# Patient Record
Sex: FEMALE | Race: WHITE | NOT HISPANIC OR LATINO | URBAN - METROPOLITAN AREA
[De-identification: names, ages, dates, MRNs, and addresses within clinical notes are randomized per-mention and may not be internally consistent; named-entity substitution may affect disease eponyms.]

---

## 2017-04-13 ENCOUNTER — OUTPATIENT (OUTPATIENT)
Dept: OUTPATIENT SERVICES | Facility: HOSPITAL | Age: 72
LOS: 1 days | Discharge: HOME | End: 2017-04-13

## 2017-04-13 DIAGNOSIS — Z02.1 ENCOUNTER FOR PRE-EMPLOYMENT EXAMINATION: ICD-10-CM

## 2017-04-13 LAB
MEV IGG SER-ACNC: >300 AU/ML — SIGNIFICANT CHANGE UP
MEV IGG+IGM SER-IMP: POSITIVE — SIGNIFICANT CHANGE UP
MUV AB SER-ACNC: POSITIVE — SIGNIFICANT CHANGE UP
MUV IGG FLD-ACNC: 66.9 AU/ML — SIGNIFICANT CHANGE UP
RUBV IGG SER-ACNC: 32.6 INDEX — SIGNIFICANT CHANGE UP
RUBV IGG SER-IMP: POSITIVE — SIGNIFICANT CHANGE UP
VZV IGG FLD QL IA: 236.9 INDEX — SIGNIFICANT CHANGE UP
VZV IGG FLD QL IA: POSITIVE — SIGNIFICANT CHANGE UP

## 2017-11-15 PROBLEM — Z00.00 ENCOUNTER FOR PREVENTIVE HEALTH EXAMINATION: Status: ACTIVE | Noted: 2017-11-15

## 2017-12-15 ENCOUNTER — APPOINTMENT (OUTPATIENT)
Dept: OBGYN | Facility: CLINIC | Age: 72
End: 2017-12-15
Payer: MEDICARE

## 2017-12-15 PROCEDURE — G0101: CPT

## 2017-12-15 PROCEDURE — 82270 OCCULT BLOOD FECES: CPT

## 2017-12-18 RX ORDER — ROSUVASTATIN CALCIUM 5 MG/1
5 TABLET, FILM COATED ORAL
Qty: 90 | Refills: 0 | Status: ACTIVE | COMMUNITY
Start: 2017-04-11

## 2017-12-18 RX ORDER — AMLODIPINE BESYLATE 5 MG/1
5 TABLET ORAL
Qty: 90 | Refills: 0 | Status: ACTIVE | COMMUNITY
Start: 2017-10-30

## 2018-01-03 LAB — CYTOLOGY CVX/VAG DOC THIN PREP: NORMAL

## 2018-01-05 ENCOUNTER — RESULT REVIEW (OUTPATIENT)
Age: 73
End: 2018-01-05

## 2018-02-03 ENCOUNTER — TRANSCRIPTION ENCOUNTER (OUTPATIENT)
Age: 73
End: 2018-02-03

## 2019-01-22 ENCOUNTER — APPOINTMENT (OUTPATIENT)
Dept: OBGYN | Facility: CLINIC | Age: 74
End: 2019-01-22
Payer: MEDICARE

## 2019-01-22 DIAGNOSIS — C34.11 MALIGNANT NEOPLASM OF UPPER LOBE, RIGHT BRONCHUS OR LUNG: ICD-10-CM

## 2019-01-22 DIAGNOSIS — M85.80 OTHER SPECIFIED DISORDERS OF BONE DENSITY AND STRUCTURE, UNSPECIFIED SITE: ICD-10-CM

## 2019-01-22 PROCEDURE — 82270 OCCULT BLOOD FECES: CPT

## 2019-01-22 PROCEDURE — G0101: CPT

## 2019-01-28 NOTE — PHYSICAL EXAM
[Awake] : awake [Alert] : alert [Acute Distress] : no acute distress [Mass] : no breast mass [Nipple Discharge] : no nipple discharge [Axillary LAD] : no axillary lymphadenopathy [Soft] : soft [Tender] : non tender [Oriented x3] : oriented to person, place, and time [Normal] : uterus [Atrophy] : atrophy [Rectocele] : a rectocele [No Bleeding] : there was no active vaginal bleeding [Uterine Adnexae] : were not tender and not enlarged [No Tenderness] : no rectal tenderness [Occult Blood] : occult blood test from digital rectal exam was negative [Nl Sphincter Tone] : normal sphincter tone [Internal Hemorrhoid] : no internal hemorrhoids [External Hemorrhoid] : no external hemorrhoids [FreeTextEntry6] : NEG

## 2019-01-28 NOTE — CHIEF COMPLAINT
[FreeTextEntry1] : Patient presents for annual visit.  Patient with a known rectocele and vaginal atrophy.  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

## 2019-12-02 ENCOUNTER — TRANSCRIPTION ENCOUNTER (OUTPATIENT)
Age: 74
End: 2019-12-02

## 2020-01-13 ENCOUNTER — APPOINTMENT (OUTPATIENT)
Dept: OBGYN | Facility: CLINIC | Age: 75
End: 2020-01-13

## 2020-01-28 ENCOUNTER — APPOINTMENT (OUTPATIENT)
Dept: OBGYN | Facility: CLINIC | Age: 75
End: 2020-01-28
Payer: MEDICARE

## 2020-01-28 VITALS
DIASTOLIC BLOOD PRESSURE: 60 MMHG | WEIGHT: 113 LBS | HEIGHT: 63 IN | BODY MASS INDEX: 20.02 KG/M2 | SYSTOLIC BLOOD PRESSURE: 132 MMHG

## 2020-01-28 DIAGNOSIS — N81.6 RECTOCELE: ICD-10-CM

## 2020-01-28 PROCEDURE — G0101: CPT

## 2020-01-28 NOTE — COUNSELING
[Breast Self Exam] : breast self exam [Nutrition] : nutrition [Exercise] : exercise [Vitamins/Supplements] : vitamins/supplements [Bladder Hygiene] : bladder hygiene [Vulvar Hygiene] : vulvar hygiene

## 2020-01-28 NOTE — PHYSICAL EXAM
[Awake] : awake [Alert] : alert [Soft] : soft [Oriented x3] : oriented to person, place, and time [Normal] : uterus [Atrophy] : atrophy [No Bleeding] : there was no active vaginal bleeding [Rectocele] : a rectocele [Uterine Adnexae] : were not tender and not enlarged [No Tenderness] : no rectal tenderness [Nl Sphincter Tone] : normal sphincter tone [Acute Distress] : no acute distress [Mass] : no breast mass [Nipple Discharge] : no nipple discharge [Tender] : non tender [Axillary LAD] : no axillary lymphadenopathy [Occult Blood] : occult blood test from digital rectal exam was negative [Internal Hemorrhoid] : no internal hemorrhoids [External Hemorrhoid] : no external hemorrhoids [de-identified] : LICHEN SCLEROSIS NOTED [FreeTextEntry6] : NEG

## 2020-12-06 ENCOUNTER — OUTPATIENT (OUTPATIENT)
Dept: OUTPATIENT SERVICES | Facility: HOSPITAL | Age: 75
LOS: 1 days | End: 2020-12-06
Payer: MEDICARE

## 2020-12-06 DIAGNOSIS — Z11.59 ENCOUNTER FOR SCREENING FOR OTHER VIRAL DISEASES: ICD-10-CM

## 2020-12-06 LAB — SARS-COV-2 RNA SPEC QL NAA+PROBE: SIGNIFICANT CHANGE UP

## 2020-12-06 PROCEDURE — U0003: CPT

## 2020-12-07 DIAGNOSIS — Z11.59 ENCOUNTER FOR SCREENING FOR OTHER VIRAL DISEASES: ICD-10-CM

## 2021-02-05 ENCOUNTER — APPOINTMENT (OUTPATIENT)
Dept: OBGYN | Facility: CLINIC | Age: 76
End: 2021-02-05

## 2021-03-09 ENCOUNTER — APPOINTMENT (OUTPATIENT)
Dept: OBGYN | Facility: CLINIC | Age: 76
End: 2021-03-09
Payer: MEDICARE

## 2021-03-09 PROCEDURE — 99214 OFFICE O/P EST MOD 30 MIN: CPT

## 2021-03-09 RX ORDER — GINGER ROOT/GINGER ROOT EXT 262.5 MG
CAPSULE ORAL
Refills: 0 | Status: ACTIVE | COMMUNITY

## 2021-03-09 RX ORDER — HYDROCORTISONE 25 MG/G
2.5 CREAM TOPICAL DAILY
Qty: 1 | Refills: 3 | Status: ACTIVE | COMMUNITY
Start: 2021-03-09 | End: 1900-01-01

## 2021-03-09 NOTE — PHYSICAL EXAM
[Appropriately responsive] : appropriately responsive [Alert] : alert [No Acute Distress] : no acute distress [No Lymphadenopathy] : no lymphadenopathy [Regular Rate Rhythm] : regular rate rhythm [No Murmurs] : no murmurs [Clear to Auscultation B/L] : clear to auscultation bilaterally [Soft] : soft [Non-tender] : non-tender [Non-distended] : non-distended [No HSM] : No HSM [No Lesions] : no lesions [No Mass] : no mass [Oriented x3] : oriented x3 [Examination Of The Breasts] : a normal appearance [No Masses] : no breast masses were palpable [Vulvar Atrophy] : vulvar atrophy [Labia Majora] : normal [Labia Minora] : normal [Atrophy] : atrophy [Uterine Adnexae] : normal [Normal rectal exam] : was normal [Normal Brown Stool] : was normal and brown [Occult Blood Positive] : was negative for occult blood analysis [Internal Hemorrhoid] : no internal hemorrhoids were present [External Hemorrhoid] : no external hemorrhoids were present [Skin Tags] : no residual hemorrhoidal skin tags [Normal] : was normal [None] : there was no rectal mass

## 2021-03-09 NOTE — PLAN
[FreeTextEntry1] : 77 YO FEMALE PRESENTS FOR ANNUAL GYN EXAMINATION. SELF BREAST EXAMINATION TAUGHT. MAMMOGRAM ORDERED. EXERCISE, CALCIUM AND VITAMIN D3 SUPPLEMENTATION DISCUSSED. BONE DENSITY STUDY AND INDICATIONS REVIEWED. COLONOSCOPY HISTORY REVIEWED AND DISCUSSED FOLLOWUP.

## 2021-07-19 ENCOUNTER — TRANSCRIPTION ENCOUNTER (OUTPATIENT)
Age: 76
End: 2021-07-19

## 2021-08-07 ENCOUNTER — TRANSCRIPTION ENCOUNTER (OUTPATIENT)
Age: 76
End: 2021-08-07

## 2021-12-13 ENCOUNTER — RESULT REVIEW (OUTPATIENT)
Age: 76
End: 2021-12-13

## 2022-03-24 ENCOUNTER — NON-APPOINTMENT (OUTPATIENT)
Age: 77
End: 2022-03-24

## 2022-03-29 ENCOUNTER — APPOINTMENT (OUTPATIENT)
Dept: OBGYN | Facility: CLINIC | Age: 77
End: 2022-03-29
Payer: MEDICARE

## 2022-03-29 VITALS
BODY MASS INDEX: 19.67 KG/M2 | HEIGHT: 63 IN | SYSTOLIC BLOOD PRESSURE: 120 MMHG | TEMPERATURE: 98 F | DIASTOLIC BLOOD PRESSURE: 80 MMHG | WEIGHT: 111 LBS

## 2022-03-29 DIAGNOSIS — Z01.419 ENCOUNTER FOR GYNECOLOGICAL EXAMINATION (GENERAL) (ROUTINE) W/OUT ABNORMAL FINDINGS: ICD-10-CM

## 2022-03-29 PROCEDURE — 99397 PER PM REEVAL EST PAT 65+ YR: CPT | Mod: GY

## 2022-03-29 PROCEDURE — G0101: CPT

## 2022-03-29 PROCEDURE — G0328 FECAL BLOOD SCRN IMMUNOASSAY: CPT | Mod: QW

## 2022-03-29 NOTE — PHYSICAL EXAM
[Appropriately responsive] : appropriately responsive [Alert] : alert [No Acute Distress] : no acute distress [No Lymphadenopathy] : no lymphadenopathy [Regular Rate Rhythm] : regular rate rhythm [No Murmurs] : no murmurs [Clear to Auscultation B/L] : clear to auscultation bilaterally [Soft] : soft [Non-tender] : non-tender [Non-distended] : non-distended [No HSM] : No HSM [No Lesions] : no lesions [No Mass] : no mass [Oriented x3] : oriented x3 [Examination Of The Breasts] : a normal appearance [No Masses] : no breast masses were palpable [Vulvar Atrophy] : vulvar atrophy [Labia Majora] : normal [Labia Minora] : normal [Atrophy] : atrophy [Uterine Adnexae] : normal [Normal rectal exam] : was normal [Normal Brown Stool] : was normal and brown [Occult Blood Positive] : was negative for occult blood analysis [Internal Hemorrhoid] : no internal hemorrhoids were present [External Hemorrhoid] : no external hemorrhoids were present [Skin Tags] : no residual hemorrhoidal skin tags [Normal] : was normal [None] : there was no rectal mass  [FreeTextEntry2] : LICHENS NOTED

## 2022-03-29 NOTE — PLAN
[FreeTextEntry1] : PT WITH VISIBLE VULVAR DYSTROPHY. DISCUSSED THE LIKELY DIAGNOSIS. DISCUSSED TREATMENT OPTIONS. WILL GIVE A RX OF CLOBATESOL. IF NOT IMPROVED THE PATIENT IS TO RETURN FOR REEVALUATION. INSTRUCTIONS ON USE GIVEN. PAP DONE, MAMMO AND DEXA ORDERED

## 2022-04-15 ENCOUNTER — NON-APPOINTMENT (OUTPATIENT)
Age: 77
End: 2022-04-15

## 2022-07-07 ENCOUNTER — NON-APPOINTMENT (OUTPATIENT)
Age: 77
End: 2022-07-07

## 2022-08-10 ENCOUNTER — NON-APPOINTMENT (OUTPATIENT)
Age: 77
End: 2022-08-10

## 2022-08-12 ENCOUNTER — APPOINTMENT (OUTPATIENT)
Dept: GASTROENTEROLOGY | Facility: CLINIC | Age: 77
End: 2022-08-12

## 2022-08-12 VITALS
HEART RATE: 92 BPM | HEIGHT: 63 IN | WEIGHT: 104 LBS | BODY MASS INDEX: 18.43 KG/M2 | SYSTOLIC BLOOD PRESSURE: 137 MMHG | DIASTOLIC BLOOD PRESSURE: 91 MMHG

## 2022-08-12 DIAGNOSIS — Z12.11 ENCOUNTER FOR SCREENING FOR MALIGNANT NEOPLASM OF COLON: ICD-10-CM

## 2022-08-12 DIAGNOSIS — K92.1 MELENA: ICD-10-CM

## 2022-08-12 PROCEDURE — 99204 OFFICE O/P NEW MOD 45 MIN: CPT

## 2022-08-12 NOTE — ASSESSMENT
[FreeTextEntry1] : Christina Jiang is a 77 year old female presenting today for initial evaluation. Was sent by her PCP because pt had a few episodes of dark stools about a month ago, that has since resolved. Pt was supposed to have screening colonoscopy in 2020 prior to the pandemic and never rescheduled. Last colonoscopy prior to that was approximately ten years ago. Besides episode of black stools, typically has regular bowel movements either every day or every other day without straining, does report that she finds she is very gassy and her stomach makes a lot of noise after she eats. Reports GERD symptoms frequently unrelieved by OTC medications. Denies dysphagia, nausea, or vomiting. \par \par Plan:\par Recommend colonoscopy for screening purposes since pt is due, risks versus benefits as well as instructions discussed. Also recommend EGD since pt has persistent GERD despite medications. Pt agrees to plan, discussed with Dr. Sesay.

## 2022-08-12 NOTE — PHYSICAL EXAM
[General Appearance - Alert] : alert [General Appearance - In No Acute Distress] : in no acute distress [Sclera] : the sclera and conjunctiva were normal [Neck Appearance] : the appearance of the neck was normal [] : no respiratory distress [Respiration, Rhythm And Depth] : normal respiratory rhythm and effort [Heart Rate And Rhythm] : heart rate was normal and rhythm regular [Bowel Sounds] : normal bowel sounds [Abdomen Soft] : soft [Abdomen Tenderness] : non-tender [Abnormal Walk] : normal gait [Skin Color & Pigmentation] : normal skin color and pigmentation [Oriented To Time, Place, And Person] : oriented to person, place, and time

## 2022-08-12 NOTE — HISTORY OF PRESENT ILLNESS
[de-identified] : Christina Jiang is a 77 year old female presenting today for initial evaluation. Was sent by her PCP because pt had a few episodes of dark stools about a month ago, that has since resolved. Pt was supposed to have screening colonoscopy in 2020 prior to the pandemic and never rescheduled. Last colonoscopy prior to that was approximately ten years ago. Besides episode of black stools, typically has regular bowel movements either every day or every other day without straining, does report that she finds she is very gassy and her stomach makes a lot of noise after she eats. Reports GERD symptoms frequently unrelieved by OTC medications. Denies dysphagia, nausea, or vomiting.

## 2022-08-12 NOTE — REVIEW OF SYSTEMS
[Abdominal Pain] : no abdominal pain [Vomiting] : no vomiting [Constipation] : no constipation [Diarrhea] : no diarrhea [Heartburn] : heartburn [Melena] : melmisty [Negative] : Heme/Lymph

## 2022-09-28 ENCOUNTER — APPOINTMENT (OUTPATIENT)
Dept: GASTROENTEROLOGY | Facility: AMBULATORY MEDICAL SERVICES | Age: 77
End: 2022-09-28

## 2022-09-28 ENCOUNTER — RESULT REVIEW (OUTPATIENT)
Age: 77
End: 2022-09-28

## 2022-09-28 PROCEDURE — 43239 EGD BIOPSY SINGLE/MULTIPLE: CPT

## 2022-09-28 PROCEDURE — 45378 DIAGNOSTIC COLONOSCOPY: CPT

## 2022-12-06 ENCOUNTER — EMERGENCY (EMERGENCY)
Facility: HOSPITAL | Age: 77
LOS: 0 days | Discharge: HOME | End: 2022-12-06
Attending: STUDENT IN AN ORGANIZED HEALTH CARE EDUCATION/TRAINING PROGRAM
Payer: MEDICARE

## 2022-12-06 VITALS
HEART RATE: 74 BPM | OXYGEN SATURATION: 100 % | SYSTOLIC BLOOD PRESSURE: 127 MMHG | TEMPERATURE: 98 F | DIASTOLIC BLOOD PRESSURE: 99 MMHG | RESPIRATION RATE: 18 BRPM

## 2022-12-06 VITALS
SYSTOLIC BLOOD PRESSURE: 122 MMHG | TEMPERATURE: 98 F | DIASTOLIC BLOOD PRESSURE: 81 MMHG | RESPIRATION RATE: 16 BRPM | HEART RATE: 71 BPM | OXYGEN SATURATION: 99 %

## 2022-12-06 DIAGNOSIS — Z20.822 CONTACT WITH AND (SUSPECTED) EXPOSURE TO COVID-19: ICD-10-CM

## 2022-12-06 DIAGNOSIS — C41.1 MALIGNANT NEOPLASM OF MANDIBLE: ICD-10-CM

## 2022-12-06 DIAGNOSIS — R06.02 SHORTNESS OF BREATH: ICD-10-CM

## 2022-12-06 DIAGNOSIS — I10 ESSENTIAL (PRIMARY) HYPERTENSION: ICD-10-CM

## 2022-12-06 DIAGNOSIS — Z88.2 ALLERGY STATUS TO SULFONAMIDES: ICD-10-CM

## 2022-12-06 DIAGNOSIS — E03.9 HYPOTHYROIDISM, UNSPECIFIED: ICD-10-CM

## 2022-12-06 DIAGNOSIS — R91.8 OTHER NONSPECIFIC ABNORMAL FINDING OF LUNG FIELD: ICD-10-CM

## 2022-12-06 DIAGNOSIS — E78.5 HYPERLIPIDEMIA, UNSPECIFIED: ICD-10-CM

## 2022-12-06 DIAGNOSIS — J45.909 UNSPECIFIED ASTHMA, UNCOMPLICATED: ICD-10-CM

## 2022-12-06 LAB
ALBUMIN SERPL ELPH-MCNC: 4.1 G/DL — SIGNIFICANT CHANGE UP (ref 3.3–5)
ALP SERPL-CCNC: 115 U/L — SIGNIFICANT CHANGE UP (ref 40–120)
ALT FLD-CCNC: 35 U/L — SIGNIFICANT CHANGE UP (ref 12–78)
ANION GAP SERPL CALC-SCNC: 4 MMOL/L — LOW (ref 5–17)
AST SERPL-CCNC: 28 U/L — SIGNIFICANT CHANGE UP (ref 15–37)
BASE EXCESS BLDV CALC-SCNC: 5.8 MMOL/L — SIGNIFICANT CHANGE UP
BASOPHILS # BLD AUTO: 0.07 K/UL — SIGNIFICANT CHANGE UP (ref 0–0.2)
BASOPHILS NFR BLD AUTO: 0.9 % — SIGNIFICANT CHANGE UP (ref 0–2)
BILIRUB SERPL-MCNC: 0.5 MG/DL — SIGNIFICANT CHANGE UP (ref 0.2–1.2)
BLOOD GAS COMMENTS, VENOUS: SIGNIFICANT CHANGE UP
BUN SERPL-MCNC: 15 MG/DL — SIGNIFICANT CHANGE UP (ref 7–23)
CALCIUM SERPL-MCNC: 10 MG/DL — SIGNIFICANT CHANGE UP (ref 8.5–10.1)
CHLORIDE SERPL-SCNC: 106 MMOL/L — SIGNIFICANT CHANGE UP (ref 96–108)
CO2 BLDV-SCNC: 33 MMOL/L — HIGH (ref 22–26)
CO2 SERPL-SCNC: 30 MMOL/L — SIGNIFICANT CHANGE UP (ref 22–31)
CREAT SERPL-MCNC: 0.77 MG/DL — SIGNIFICANT CHANGE UP (ref 0.5–1.3)
EGFR: 79 ML/MIN/1.73M2 — SIGNIFICANT CHANGE UP
EOSINOPHIL # BLD AUTO: 0.21 K/UL — SIGNIFICANT CHANGE UP (ref 0–0.5)
EOSINOPHIL NFR BLD AUTO: 2.6 % — SIGNIFICANT CHANGE UP (ref 0–6)
GLUCOSE SERPL-MCNC: 100 MG/DL — HIGH (ref 70–99)
HCO3 BLDV-SCNC: 32 MMOL/L — HIGH (ref 22–29)
HCT VFR BLD CALC: 45.9 % — HIGH (ref 34.5–45)
HGB BLD-MCNC: 15.7 G/DL — HIGH (ref 11.5–15.5)
IMM GRANULOCYTES NFR BLD AUTO: 0.3 % — SIGNIFICANT CHANGE UP (ref 0–0.9)
LYMPHOCYTES # BLD AUTO: 1.65 K/UL — SIGNIFICANT CHANGE UP (ref 1–3.3)
LYMPHOCYTES # BLD AUTO: 20.6 % — SIGNIFICANT CHANGE UP (ref 13–44)
MCHC RBC-ENTMCNC: 30.9 PG — SIGNIFICANT CHANGE UP (ref 27–34)
MCHC RBC-ENTMCNC: 34.2 GM/DL — SIGNIFICANT CHANGE UP (ref 32–36)
MCV RBC AUTO: 90.4 FL — SIGNIFICANT CHANGE UP (ref 80–100)
MONOCYTES # BLD AUTO: 0.7 K/UL — SIGNIFICANT CHANGE UP (ref 0–0.9)
MONOCYTES NFR BLD AUTO: 8.8 % — SIGNIFICANT CHANGE UP (ref 2–14)
NEUTROPHILS # BLD AUTO: 5.35 K/UL — SIGNIFICANT CHANGE UP (ref 1.8–7.4)
NEUTROPHILS NFR BLD AUTO: 66.8 % — SIGNIFICANT CHANGE UP (ref 43–77)
NT-PROBNP SERPL-SCNC: 68 PG/ML — SIGNIFICANT CHANGE UP (ref 0–450)
PCO2 BLDV: 50 MMHG — HIGH (ref 39–42)
PH BLDV: 7.41 — SIGNIFICANT CHANGE UP (ref 7.32–7.43)
PLATELET # BLD AUTO: 272 K/UL — SIGNIFICANT CHANGE UP (ref 150–400)
PO2 BLDV: 43 MMHG — SIGNIFICANT CHANGE UP
POTASSIUM SERPL-MCNC: 4.1 MMOL/L — SIGNIFICANT CHANGE UP (ref 3.5–5.3)
POTASSIUM SERPL-SCNC: 4.1 MMOL/L — SIGNIFICANT CHANGE UP (ref 3.5–5.3)
PROT SERPL-MCNC: 7.7 GM/DL — SIGNIFICANT CHANGE UP (ref 6–8.3)
RAPID RVP RESULT: SIGNIFICANT CHANGE UP
RBC # BLD: 5.08 M/UL — SIGNIFICANT CHANGE UP (ref 3.8–5.2)
RBC # FLD: 12.4 % — SIGNIFICANT CHANGE UP (ref 10.3–14.5)
SAO2 % BLDV: 68.8 % — SIGNIFICANT CHANGE UP
SARS-COV-2 RNA SPEC QL NAA+PROBE: SIGNIFICANT CHANGE UP
SODIUM SERPL-SCNC: 140 MMOL/L — SIGNIFICANT CHANGE UP (ref 135–145)
TROPONIN I, HIGH SENSITIVITY RESULT: 5.54 NG/L — SIGNIFICANT CHANGE UP
WBC # BLD: 8 K/UL — SIGNIFICANT CHANGE UP (ref 3.8–10.5)
WBC # FLD AUTO: 8 K/UL — SIGNIFICANT CHANGE UP (ref 3.8–10.5)

## 2022-12-06 PROCEDURE — 71275 CT ANGIOGRAPHY CHEST: CPT | Mod: 26,MA

## 2022-12-06 PROCEDURE — 80053 COMPREHEN METABOLIC PANEL: CPT

## 2022-12-06 PROCEDURE — 71045 X-RAY EXAM CHEST 1 VIEW: CPT | Mod: 26

## 2022-12-06 PROCEDURE — 0225U NFCT DS DNA&RNA 21 SARSCOV2: CPT

## 2022-12-06 PROCEDURE — 71275 CT ANGIOGRAPHY CHEST: CPT | Mod: MA

## 2022-12-06 PROCEDURE — 83880 ASSAY OF NATRIURETIC PEPTIDE: CPT

## 2022-12-06 PROCEDURE — 93005 ELECTROCARDIOGRAM TRACING: CPT

## 2022-12-06 PROCEDURE — 71045 X-RAY EXAM CHEST 1 VIEW: CPT

## 2022-12-06 PROCEDURE — 93010 ELECTROCARDIOGRAM REPORT: CPT | Mod: 76

## 2022-12-06 PROCEDURE — 36415 COLL VENOUS BLD VENIPUNCTURE: CPT

## 2022-12-06 PROCEDURE — 84484 ASSAY OF TROPONIN QUANT: CPT

## 2022-12-06 PROCEDURE — 99285 EMERGENCY DEPT VISIT HI MDM: CPT | Mod: 25

## 2022-12-06 PROCEDURE — 99284 EMERGENCY DEPT VISIT MOD MDM: CPT | Mod: CS

## 2022-12-06 PROCEDURE — 85025 COMPLETE CBC W/AUTO DIFF WBC: CPT

## 2022-12-06 PROCEDURE — 82803 BLOOD GASES ANY COMBINATION: CPT

## 2022-12-06 NOTE — ED PROVIDER NOTE - OBJECTIVE STATEMENT
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 Pt reports she was a former smoker for about 10 years in her 20s, less than a pack of cigarettes per day. Pt reports her stool has been "dark brown".

## 2022-12-06 NOTE — ED PROVIDER NOTE - PROGRESS NOTE DETAILS
no anemia. no acs. no pe. no ptx. no pna. pt has sinus congestion. likely cause of sob. will have her follow up with cards. walking o2 sat is 96 percente. An extensive discussion was had with the patient regarding labs/imaging and tests prior to discharge. We discussed in depth the importance of follow up for continuing medical care as an outpatient. The patient was advised to return the Emergency Department for worsening or persistent symptoms, and/or any new or concerning symptoms.

## 2022-12-06 NOTE — ED PROVIDER NOTE - NSFOLLOWUPINSTRUCTIONS_ED_ALL_ED_FT
Seek immediate medical assistance for any new or worsening symptoms. If you have issues obtaining follow up, please call: [phone] ([phone] or [phone]  to obtain a doctor or specialist who takes your insurance in your area.         Please follow up with your PCP THIS week.  Also follow up with pulmonology and cardiology.     Return to the ER immediately for new or worsening shortness of breath, chest pain, or any other concerning symptoms.   See attached information on shortness of breath.  Shortness of Breath  WHAT YOU NEED TO KNOW:  Shortness of breath is a feeling that you cannot get enough air when you breathe in. You may have this feeling only during activity, or all the time. Your symptoms can range from mild to severe. Shortness of breath may be a sign of a serious health condition that needs immediate care.  DISCHARGE INSTRUCTIONS:  Seek care immediately if:   •Your signs and symptoms are the same or worse within 24 hours of treatment.   •The skin over your ribs or on your neck sinks in when you breathe.   •You feel confused or dizzy.  Contact your healthcare provider if:   •You have new or worsening symptoms.  •You have questions or concerns about your condition or care.  Medicines:    •Medicines may be used to treat the cause of your symptoms. You may need medicine to treat a bacterial infection or reduce anxiety. Other medicines may be used to open your airway, reduce swelling, or remove extra fluid. If you have a heart condition, you may need medicine to help your heart beat more strongly or regularly.  •Take your medicine as directed. Contact your healthcare provider if you think your medicine is not helping or if you have side effects. Tell him or her if you are allergic to any medicine. Keep a list of the medicines, vitamins, and herbs you take. Include the amounts, and when and why you take them. Bring the list or the pill bottles to follow-up visits. Carry your medicine list with you in case of an emergency.  Manage shortness of breath:   •Create an action plan. You and your healthcare provider can work together to create a plan for how to handle shortness of breath. The plan can include daily activities, treatment changes, and what to do if you have severe breathing problems.  •Lean forward on your elbows when you sit. This helps your lungs expand and may make it easier to breathe.  •Use pursed-lip breathing any time you feel short of breath. Breathe in through your nose and then slowly breathe out through your mouth with your lips slightly puckered. It should take you twice as long to breathe out as it did to breathe in.  Breathe in Breathe out  •Do not smoke. Nicotine and other chemicals in cigarettes and cigars can cause lung damage and make shortness of breath worse. Ask your healthcare provider for information if you currently smoke and need help to quit. E-cigarettes or smokeless tobacco still contain nicotine. Talk to your healthcare provider before you use these products.  •Reach or maintain a healthy weight. Your healthcare provider can help you create a safe weight loss plan if you are overweight.  •Exercise as directed. Exercise can help your lungs work more easily. Exercise can also help you lose weight if needed. Try to get at least 30 minutes of exercise most days of the week.  Follow up with your healthcare provider or specialist as directed: Write down your questions so you remember to ask them during your visits.    Falta de aliento  LO QUE NECESITA SABER:  La falta de aliento es la sensación de que no puede obtener suficiente aire cuando respira. Usted puede tener jorge l sentimiento solo enrico la actividad, o todo el tiempo. Los síntomas pueden variar de leves a severos. La falta de aliento puede ser un signo de don condición de isreal grave que requiere atención inmediata.  INSTRUCCIONES SOBRE EL TEE HOSPITALARIA:  Busque atención médica de inmediato si:  •Jennifer signos y síntomas están igual o empeoran en las siguientes 24 horas del tratamiento.  •La piel sobre jennifer costillas o en mckinney anjel se hunden cuando usted respira.  •Usted se siente confundido o mareado  Comuníquese con mckinney médico si:  •Usted tiene nuevos síntomas o jennifer síntomas empeoran.  •Usted tiene preguntas o inquietudes acerca de mckinney condición o cuidado.  Medicamentos:  •Los medicamentosLos medicamentos se pueden usar para encontrar la causa de los síntomas. Usted podría necesitar medicamentos para tratar don infección bacteriana o para reducir la ansiedad. Otros medicamentos pueden utilizarse para abrir las vías respiratorias, desinflamar o quitar líquido extra. Si usted tiene don condición del corazón, puede necesitar medicamentos para ayudar a que mckinney corazón palpite más coyb o regularmente.  •[place] jennifer medicamentos barbara se le haya indicado.Consulte con mckinney médico si usted gisela que mckinney medicamento no le está ayudando o si presenta efectos secundarios. Infórmele si es alérgico a cualquier medicamento. Mantenga don lista actualizada de los medicamentos, las vitaminas y los productos herbales que bertram. Incluya los siguientes datos de los medicamentos: cantidad, frecuencia y motivo de administración. Traiga con usted la lista o los envases de las píldoras a jennifer citas de seguimiento. Lleve la lista de los medicamentos con usted en cyn de don emergencia.  Maneje la falta de aliento:  •[place] un plan de acción.Usted y mckinney médico pueden trabajar juntos a fin de crear un plan para manejar la falta de aliento. El plan puede incluir las actividades diarias, cambios de tratamiento y qué hacer si usted tiene problemas respiratorios graves.  •Al luanne asiento inclínese hacia adelante en jennifer codos.[place] ayuda a expandir los pulmones y puede que le sea más fácil respirar.  •Use la respiración con los labios fruncidos cada vez que se sienta corto de respiración.Respire por la nariz y muy despacio exhale por mckinney boca con los labios ligeramente fruncidos o en forma de ''U''. Se debería demorar el doble de tiempo al expulsar el aire de lo que le guillermo en inhalarlo.  Inhalar y exhalar  •No fume.La nicotina y otras sustancias químicas que contienen los cigarrillos y puros pueden causar daño pulmonar y empeorar la falta de aliento. Pida información a mckinney médico si usted actualmente fuma y necesita ayuda para dejar de fumar. Los cigarrillos electrónicos o el tabaco sin humo igualmente contienen nicotina. Consulte con mckinney médico antes de utilizar estos productos.  •[place] o mantenga un peso saludable.Mckinney médico le puede ayudar a elaborar un plan para perder peso de don forma irvin si usted tiene sobrepeso.  •Ejercítese según indicaciones.El ejercicio puede ayudar a los pulmones a trabajar más fácilmente. El ejercicio también puede ayudar a perder peso, si es necesario. Trate de hacer unos 30 minutos de ejercicio la mayoría de los días de la semana.  Gloria don bubba de control con mckinney médico o especialista barbara se lo indicaron:Anote jennifer preguntas para que se acuerde de hacerlas enrico jennifer visitas.

## 2022-12-06 NOTE — ED PROVIDER NOTE - PATIENT PORTAL LINK FT
You can access the FollowMyHealth Patient Portal offered by [facility] by registering at the following website: [email or web address]. By joining [facility]’s FollowMyHealth portal, you will also be able to view your health information using other applications (apps) compatible with our system.

## 2022-12-06 NOTE — ED PROVIDER NOTE - NSICDXPASTMEDICALHX_GEN_ALL_CORE_FT
PAST MEDICAL HISTORY:  Asthma     HLD (hyperlipidemia)     HTN (hypertension)     Hypothyroid     Osteogenic sarcoma

## 2022-12-06 NOTE — ED ADULT NURSE NOTE - OBJECTIVE STATEMENT
Patient received AA&Ox4-afebrile. Respirations even and unlabored 99% on @2L nasal cannula. Skin dry and intact. denies pain or discomfort. c/o sudden onset of sob since last pm increase this am. denies cough or congestion. MD at bedside to evaluate.

## 2022-12-06 NOTE — ED PROVIDER NOTE - CLINICAL SUMMARY MEDICAL DECISION MAKING FREE TEXT BOX
Elderly female presents with acute onset of SOB in the setting of dark colored stools over the past 3 days. Vitals normal, no hypoxia. Lungs clear on exam, mildly SOB when speaking. Plan: r/o PE, ACS, Anemia, and reassess.

## 2022-12-21 PROBLEM — J45.909 UNSPECIFIED ASTHMA, UNCOMPLICATED: Chronic | Status: ACTIVE | Noted: 2022-12-06

## 2022-12-21 PROBLEM — I10 ESSENTIAL (PRIMARY) HYPERTENSION: Chronic | Status: ACTIVE | Noted: 2022-12-06

## 2022-12-21 PROBLEM — E03.9 HYPOTHYROIDISM, UNSPECIFIED: Chronic | Status: ACTIVE | Noted: 2022-12-06

## 2022-12-21 PROBLEM — E78.5 HYPERLIPIDEMIA, UNSPECIFIED: Chronic | Status: ACTIVE | Noted: 2022-12-06

## 2022-12-21 PROBLEM — C41.9 MALIGNANT NEOPLASM OF BONE AND ARTICULAR CARTILAGE, UNSPECIFIED: Chronic | Status: ACTIVE | Noted: 2022-12-06

## 2023-02-03 ENCOUNTER — APPOINTMENT (OUTPATIENT)
Dept: INTERNAL MEDICINE | Facility: CLINIC | Age: 78
End: 2023-02-03

## 2023-03-30 ENCOUNTER — APPOINTMENT (OUTPATIENT)
Dept: NEUROLOGY | Facility: CLINIC | Age: 78
End: 2023-03-30
Payer: MEDICARE

## 2023-03-30 VITALS
HEIGHT: 63 IN | DIASTOLIC BLOOD PRESSURE: 88 MMHG | HEART RATE: 96 BPM | BODY MASS INDEX: 18.43 KG/M2 | TEMPERATURE: 97.8 F | SYSTOLIC BLOOD PRESSURE: 132 MMHG | WEIGHT: 104 LBS

## 2023-03-30 PROCEDURE — 99203 OFFICE O/P NEW LOW 30 MIN: CPT

## 2023-03-30 RX ORDER — LEVOTHYROXINE SODIUM 0.07 MG/1
75 TABLET ORAL DAILY
Refills: 0 | Status: ACTIVE | COMMUNITY

## 2023-03-30 NOTE — DATA REVIEWED
[de-identified] :  CLINICAL INFORMATION: Headache and syncope.\par  \par TECHNIQUE: Multiple axial sections were acquired from the base of the\par skull to the vertex without contrast enhancement. Coronal and sagittal\par reformats were additionally performed. Beam hardening artifact\par slightly obscures evaluation of the posterior fossa and brainstem.\par \par COMPARISON: No similar prior studies are available for comparison.\par  \par FINDINGS:\par Parenchymal volume loss is noted with prominent ventricles and sulci.\par Mild chronic microvascular ischemic changes are identified. No acute\par territorial infarct is demonstrated.\par  \par There is no evidence of an acute hemorrhage, mass or mass-effect in\par the posterior fossa or in the supratentorial region. \par  \par Evaluation of the osseous structures with the appropriate window\par appears unremarkable. Vascular calcifications are noted. The\par visualized paranasal sinuses and mastoid air cells are clear.\par    \par IMPRESSION: \par No acute territorial infarct, hemorrhage, mass or mass effect

## 2023-03-30 NOTE — REVIEW OF SYSTEMS
[As Noted in HPI] : as noted in HPI [Memory Lapses or Loss] : memory loss [Decr. Concentrating Ability] : decreased concentrating ability [Repeating Questions] : repeated questioning about recent events [Negative] : Heme/Lymph

## 2023-03-30 NOTE — HISTORY OF PRESENT ILLNESS
[FreeTextEntry1] : 78-year-old woman, history of hypertension referred for evaluation of memory difficulties for the last 2 years, noticed increased forgetfulness, forgetting conversations, where she was placed 3, confusion with time, and increased difficulty navigating while driving.\par Approximately 2 years ago placed on donepezil which still takes 10 mg daily, well-tolerated.\par Denies headaches, dizzy spells, no change in vision, denies any visual hallucinations, auditory hallucinations, no mood changes, no depression no anxiety.  No sleep disorders, generally feels well.  No history of head injury or trauma, no prior history of stroke or seizures.\par Denies transient visual loss, difficulty finding words or difficulty writing, denies any transient unilateral weakness or numbness of the face or extremities.  No dizziness or vertigo no unsteadiness.\par

## 2023-03-30 NOTE — DISCUSSION/SUMMARY
[FreeTextEntry1] : 78-year-old woman right-handed with a 2-year history of difficulty with short-term memory, at times inattentive examination evidence of mild cognitive dysfunction otherwise nonfocal exam.  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  Encouraged regular physical activity, discussed diet, the benefits of a Mediterranean style diet.\par Plan: Advised to continue donepezil 10 mg p.o. nightly.\par We will order a cognitive testing\par Electroencephalogram.\par Order PET scan\par Return after the above.

## 2023-03-30 NOTE — PHYSICAL EXAM
[Total Score ___ / 30] : the patient achieved a score of [unfilled] /30 [Date / Time ___ / 5] : date / time [unfilled] / 5 [Place ___ / 5] : place [unfilled] / 5 [Registration ___ / 3] : registration [unfilled] / 3 [Serial Sevens ___/5] : serial sevens [unfilled] / 5 [Naming 2 Objects ___ / 2] : naming two objects [unfilled] / 2 [Repeating a Sentence ___ / 1] : repeating a sentence [unfilled] / 1 [Writing a Sentence ___ / 1] : write sentence [unfilled] / 1 [3-stage Verbal Command ___ / 3] : three-stage verbal command [unfilled] / 3 [Written Command ___ / 1] : written command [unfilled] / 1 [Copy a Design ___ / 1] : copy a design [unfilled] / 1 [Recall ___ / 3] : recall [unfilled] / 3 [General Appearance - Alert] : alert [General Appearance - In No Acute Distress] : in no acute distress [Oriented To Time, Place, And Person] : oriented to person, place, and time [Impaired Insight] : insight and judgment were intact [Affect] : the affect was normal [Person] : oriented to person [Short Term Intact] : short term memory impaired [Span Intact] : the attention span was decreased [Concentration Intact] : normal concentrating ability [Visual Intact] : visual attention was ~T not ~L decreased [Naming Objects] : no difficulty naming common objects [Repeating Phrases] : no difficulty repeating a phrase [Writing A Sentence] : no difficulty writing a sentence [Fluency] : fluency intact [Comprehension] : comprehension intact [Reading] : reading intact [Past History] : adequate knowledge of personal past history [Cranial Nerves Optic (II)] : visual acuity intact bilaterally,  visual fields full to confrontation, pupils equal round and reactive to light [Cranial Nerves Oculomotor (III)] : extraocular motion intact [Cranial Nerves Trigeminal (V)] : facial sensation intact symmetrically [Cranial Nerves Facial (VII)] : face symmetrical [Cranial Nerves Vestibulocochlear (VIII)] : hearing was intact bilaterally [Cranial Nerves Glossopharyngeal (IX)] : tongue and palate midline [Cranial Nerves Accessory (XI - Cranial And Spinal)] : head turning and shoulder shrug symmetric [Cranial Nerves Hypoglossal (XII)] : there was no tongue deviation with protrusion [Motor Tone] : muscle tone was normal in all four extremities [Motor Strength] : muscle strength was normal in all four extremities [No Muscle Atrophy] : normal bulk in all four extremities [Motor Handedness Right-Handed] : the patient is right hand dominant [Paresis Pronator Drift Right-Sided] : no pronator drift on the right [Paresis Pronator Drift Left-Sided] : no pronator drift on the left [Sensation Tactile Decrease] : light touch was intact [Romberg's Sign] : Romberg's sign was negtive [Abnormal Walk] : normal gait [Balance] : balance was intact [Past-pointing] : there was no past-pointing [Tremor] : no tremor present [Dysdiadochokinesia Bilaterally] : not present [Coordination - Dysmetria Impaired Finger-to-Nose Bilateral] : not present [Coordination - Dysmetria Impaired Heel-to-Shin Bilateral] : not present [2+] : Ankle jerk left 2+ [Plantar Reflex Right Only] : normal on the right [Plantar Reflex Left Only] : normal on the left [___] : absent on the right [___] : absent on the left [Sclera] : the sclera and conjunctiva were normal [PERRL With Normal Accommodation] : pupils were equal in size, round, reactive to light, with normal accommodation [Extraocular Movements] : extraocular movements were intact [Full Visual Field] : full visual field [Neck Appearance] : the appearance of the neck was normal [] : the neck was supple [Arterial Pulses Carotid] : carotid pulses were normal with no bruits

## 2023-04-05 ENCOUNTER — APPOINTMENT (OUTPATIENT)
Dept: NEUROLOGY | Facility: CLINIC | Age: 78
End: 2023-04-05
Payer: MEDICARE

## 2023-04-05 PROCEDURE — 95816 EEG AWAKE AND DROWSY: CPT

## 2023-04-12 ENCOUNTER — APPOINTMENT (OUTPATIENT)
Dept: NUCLEAR MEDICINE | Facility: CLINIC | Age: 78
End: 2023-04-12
Payer: MEDICARE

## 2023-04-12 ENCOUNTER — OUTPATIENT (OUTPATIENT)
Dept: OUTPATIENT SERVICES | Facility: HOSPITAL | Age: 78
LOS: 1 days | End: 2023-04-12
Payer: MEDICARE

## 2023-04-12 DIAGNOSIS — R41.3 OTHER AMNESIA: ICD-10-CM

## 2023-04-12 PROCEDURE — 78608 BRAIN IMAGING (PET): CPT

## 2023-04-12 PROCEDURE — 78608 BRAIN IMAGING (PET): CPT | Mod: 26,MH

## 2023-04-12 PROCEDURE — A9552: CPT

## 2023-04-19 ENCOUNTER — APPOINTMENT (OUTPATIENT)
Dept: OBGYN | Facility: CLINIC | Age: 78
End: 2023-04-19
Payer: COMMERCIAL

## 2023-04-19 VITALS
HEIGHT: 62 IN | DIASTOLIC BLOOD PRESSURE: 64 MMHG | WEIGHT: 105 LBS | BODY MASS INDEX: 19.32 KG/M2 | SYSTOLIC BLOOD PRESSURE: 112 MMHG

## 2023-04-19 DIAGNOSIS — Z01.411 ENCOUNTER FOR GYNECOLOGICAL EXAMINATION (GENERAL) (ROUTINE) WITH ABNORMAL FINDINGS: ICD-10-CM

## 2023-04-19 DIAGNOSIS — R92.2 INCONCLUSIVE MAMMOGRAM: ICD-10-CM

## 2023-04-19 DIAGNOSIS — Z12.39 ENCOUNTER FOR OTHER SCREENING FOR MALIGNANT NEOPLASM OF BREAST: ICD-10-CM

## 2023-04-19 DIAGNOSIS — Z78.9 OTHER SPECIFIED HEALTH STATUS: ICD-10-CM

## 2023-04-19 DIAGNOSIS — N95.2 POSTMENOPAUSAL ATROPHIC VAGINITIS: ICD-10-CM

## 2023-04-19 DIAGNOSIS — Z87.891 PERSONAL HISTORY OF NICOTINE DEPENDENCE: ICD-10-CM

## 2023-04-19 PROCEDURE — 99397 PER PM REEVAL EST PAT 65+ YR: CPT

## 2023-04-19 RX ORDER — TRIAMCINOLONE ACETONIDE 0.25 MG/G
0.03 OINTMENT TOPICAL
Qty: 80 | Refills: 0 | Status: DISCONTINUED | COMMUNITY
Start: 2017-07-26 | End: 2023-04-19

## 2023-04-19 NOTE — PHYSICAL EXAM
[Appropriately responsive] : appropriately responsive [Alert] : alert [No Acute Distress] : no acute distress [No Lymphadenopathy] : no lymphadenopathy [Oriented x3] : oriented x3 [Examination Of The Breasts] : a normal appearance [No Masses] : no breast masses were palpable [Atrophy] : atrophy [Normal] : normal [Uterine Adnexae] : normal [FreeTextEntry1] : external presentation consistent with Lichen Sclerosis, labia minora agglutination, fusion of clitoral cook, hypopigmented skin bilaterally extending to anus

## 2023-04-19 NOTE — DISCUSSION/SUMMARY
[FreeTextEntry1] : 1) pap deferred\par 2) pt up to date with mammo/DEXA/colonoscopy\par 3) reviewed Clobetasol usage for treatment of LS\par 4) pt encouraged to start vaginal Estrogen for treatment of LS\par \par rEturn to office in one year, sooner prn

## 2023-04-19 NOTE — HISTORY OF PRESENT ILLNESS
[Currently In Menopause] : currently in menopause [Previously active] : previously active [TextBox_4] : Christina is a 77 y/o  who presents today for an annual exam.\par \par She is s/p a normal pap in  and denies an abnormal history. She is not currently sexually active.\par \par Pt has a history of LS\par \par She is s/p screening mammo 23.\par \par last DEXA was 2022, +osteopenia\par \par she is s/p colonoscopy 2022\par \par She denies ever having had an abnormal pap [Mammogramdate] : 4/13/22 [BoneDensityDate] : 4/13/22 [TextBox_37] : osteopenia [ColonoscopyDate] : 9/28/22 [TextBox_43] : diverticula

## 2023-04-24 ENCOUNTER — APPOINTMENT (OUTPATIENT)
Dept: NEUROLOGY | Facility: CLINIC | Age: 78
End: 2023-04-24
Payer: MEDICARE

## 2023-04-24 VITALS
HEART RATE: 76 BPM | SYSTOLIC BLOOD PRESSURE: 139 MMHG | HEIGHT: 62 IN | BODY MASS INDEX: 19.32 KG/M2 | WEIGHT: 105 LBS | DIASTOLIC BLOOD PRESSURE: 84 MMHG

## 2023-04-24 DIAGNOSIS — R41.3 OTHER AMNESIA: ICD-10-CM

## 2023-04-24 DIAGNOSIS — R41.89 OTHER SYMPTOMS AND SIGNS INVOLVING COGNITIVE FUNCTIONS AND AWARENESS: ICD-10-CM

## 2023-04-24 PROCEDURE — 99214 OFFICE O/P EST MOD 30 MIN: CPT

## 2023-04-24 RX ORDER — MEMANTINE HYDROCHLORIDE 5 MG/1
5 TABLET, FILM COATED ORAL
Qty: 180 | Refills: 2 | Status: ACTIVE | COMMUNITY
Start: 2023-04-24 | End: 1900-01-01

## 2023-04-24 NOTE — DISCUSSION/SUMMARY
[FreeTextEntry1] : 78-year-old female returns to clinic for follow-up evaluation for cognitive difficulties.  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  Repeat neurotrax testing in 1 year for comparative progression\par 2.  Increased activity as directed outdoor weather permitting\par 3.  Brain challenging activities to include reading aloud to stimulate 3 senses\par 4.  Increase donepezil to 10 mg every 12 hours\par 5.  Start memantine 5 mg every 12 hours\par 6.  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

## 2023-04-24 NOTE — HISTORY OF PRESENT ILLNESS
[FreeTextEntry1] : 78-year-old female returns to clinic for follow-up evaluation neuro tracts testing completed today demonstrates the following global cognitive score 72.5, memory 66.8 executive function 83.2 attention 82.2 information processing speed 69.2 visual-spatial 74 verbal function 40.6 and motor skills 91.4.  Cognitive evaluation is as follows patient denies any loss of recognition of immediate family members, presence of hallucinations verbally visually, behavioral concerns, requiring assistance with toileting hygiene or sleeping in the days close, loss of appliances such as car keys remotes cell phones, issues with appliances to include leaving the water to force it running stove on refrigerator doors open front door open with keys in it, denies any history of recent falls still drives and is retired , patient states history of temporary loss angiography 3 progressively worsening in the last 1 to 2 months with either not recently visited locations or with new places, requires list for shopping.  Patient states the recall with either short or long-term memory is approximately 5 minutes and she also has this progress of difficulty finding words.  Patient states history of progressively worsening recall and cognitive function since COVID approximately 2 years ago.

## 2023-04-24 NOTE — REVIEW OF SYSTEMS
[Memory Lapses or Loss] : memory loss [Fever] : no fever [Chills] : no chills [Seizures] : no convulsions [Dizziness] : no dizziness [Lightheadedness] : no lightheadedness [Vertigo] : no vertigo [Migraine Headache] : no migraine headache [Sleep Disturbances] : no sleep disturbances [Anxiety] : no anxiety [Depression] : no depression [Eye Pain] : no eye pain [Loss Of Hearing] : no hearing loss [Heart Rate Is Fast] : the heart rate was not fast [Chest Pain] : no chest pain [Palpitations] : no palpitations [Shortness Of Breath] : no shortness of breath [Abdominal Pain] : no abdominal pain [Dysuria] : no dysuria [Skin Lesions] : no skin lesions [Skin Wound] : no skin wound [Muscle Weakness] : no muscle weakness [Swollen Glands] : no swollen glands [Swollen Glands In The Neck] : no swollen glands in the neck

## 2023-04-24 NOTE — DATA REVIEWED
[de-identified] : Routine EEG normal [de-identified] : PET scan brain 4/12/2023: Visually there is no abnormal FDG distribution pattern with asymmetric severe decreased radial tracer uptake in the and anterior medial temporal lobes findings more pronounced on the left side.  There is extension of abnormal hypometabolismintotheparietaloccipitalandlateraltemporalregions left greater than right including in the conus and primarily visual cortex.  There is also mild to moderate hypometabolism in the orbitofrontal and paramedian frontal regions including the anterior cingulate gyri bilaterally.  Note there is relatively preserved tracer uptake in the pre-Kunis and posterior cingulate gyri, regions typically involved early in the course of Alzheimer's disease there is mild to moderate hypometabolism in the bilateral thalami and cerebellum and moderate hypometabolism in the brainstem and nonspecific findings favored to reflect effects of neurotropic medications.  There is preserved normal brain metabolism in the sensorimotor cortex.  Semiquantitative analysis using the Z score is calculated in comparison to age-matched normal controls revealed significantly decreased values in the left greater than the right temporal lobe including the left greater than right hippocampal the medial temporal lobes amygdala temporal operculum parahippocampal gyri fusiform [place] superior temporal gyrus bilateral posterior cingulate gyrus bilateral cingulate gyri left temporal pole, left middle and inferior temporal gyri left lateral temporal lobe left #old gyrus left insula left retromolar sublingual area left roll intake upper arm left anterior cingulate gyrus bilateral thalami right olfactory cortex bilateral middle cerebellar peduncles left cerebellar hemisphere cerebellum and bases of the pontis.  CT findings there is no evidence of acute infarction, intracranial hemorrhage or mass lesion.  There is hypoattenuation in the subcortical periventricular white matter which is nonspecific findings but most likely represent sequela of chronic microvascular ischemic disease.  There is atherosclerotic calcifications in the cavernous and supraclinoid paraclinoid internal carotid arteries bilaterally.  There is prominence of the cortical sulci related to the underlying brain parenchymal volume loss which is particularly pronounced in the temporal poles left greater than right.  There is no evidence of hydrocephalus there is no extra-axial fluid collections.  The visualized intraorbital contents are normal.  Imaged portions of the paranasal sinuses are aerated the mastoid air cells are clear the visualized soft tissue and osseous structures appear unremarkable.  Impression abnormal asymmetric hypometabolism particularly pronounced in the left temporal pole with accompanying volume loss on structural imaging findings consistent with underlying neurodegenerative disease.  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

## 2023-04-24 NOTE — PHYSICAL EXAM
[General Appearance - Alert] : alert [Oriented To Time, Place, And Person] : oriented to person, place, and time [Affect] : the affect was normal [Mood] : the mood was normal [Cranial Nerves Optic (II)] : visual acuity intact bilaterally,  visual fields full to confrontation, pupils equal round and reactive to light [Cranial Nerves Oculomotor (III)] : extraocular motion intact [Cranial Nerves Trigeminal (V)] : facial sensation intact symmetrically [Cranial Nerves Facial (VII)] : face symmetrical [Cranial Nerves Vestibulocochlear (VIII)] : hearing was intact bilaterally [Cranial Nerves Glossopharyngeal (IX)] : tongue and palate midline [Cranial Nerves Accessory (XI - Cranial And Spinal)] : head turning and shoulder shrug symmetric [Cranial Nerves Hypoglossal (XII)] : there was no tongue deviation with protrusion [Motor Strength] : muscle strength was normal in all four extremities [Sensation Tactile Decrease] : light touch was intact [Abnormal Walk] : normal gait [Balance] : balance was intact [1+] : Patella left 1+ [PERRL With Normal Accommodation] : pupils were equal in size, round, reactive to light, with normal accommodation [Extraocular Movements] : extraocular movements were intact [Hearing Threshold Finger Rub Not [place]] : hearing was normal [Neck Appearance] : the appearance of the neck was normal [Exaggerated Use Of Accessory Muscles For Inspiration] : no accessory muscle use [Heart Rate And Rhythm] : heart rate was normal and rhythm regular [Heart Sounds] : normal S1 and S2 [Nail Clubbing] : no clubbing  or cyanosis of the fingernails [Motor Tone] : muscle strength and tone were normal [] : no rash [Skin Lesions] : no skin lesions [Romberg's Sign] : Romberg's sign was negtive [Tremor] : no tremor present

## 2023-07-12 ENCOUNTER — NON-APPOINTMENT (OUTPATIENT)
Age: 78
End: 2023-07-12

## 2023-08-24 ENCOUNTER — APPOINTMENT (OUTPATIENT)
Dept: NEUROLOGY | Facility: CLINIC | Age: 78
End: 2023-08-24
Payer: MEDICARE

## 2023-08-24 VITALS
BODY MASS INDEX: 19.14 KG/M2 | HEART RATE: 83 BPM | HEIGHT: 62 IN | WEIGHT: 104 LBS | TEMPERATURE: 98.2 F | SYSTOLIC BLOOD PRESSURE: 148 MMHG | DIASTOLIC BLOOD PRESSURE: 76 MMHG

## 2023-08-24 DIAGNOSIS — F03.90 UNSPECIFIED DEMENTIA W/OUT BEHAVIORAL DISTURBANCE: ICD-10-CM

## 2023-08-24 PROCEDURE — 99214 OFFICE O/P EST MOD 30 MIN: CPT

## 2023-08-24 NOTE — HISTORY OF PRESENT ILLNESS
[FreeTextEntry1] : 78-year-old woman here for follow-up visit, with a history of dementia, increasingly forgetful, difficulty finding words, reports no recent worsening.  Denies any new medical problems, no recent hospitalization, no new illnesses.  No fever or chills, no recent falls. .  Evaluation including EEG which was normal, brain  pET scan and April of this year showed asymmetric hypometabolism of the left temporal poles with accompanying volume loss findings consistent with underlying neurodegenerative disease, the semantic variant of primary progressive aphasia. She was treated with donepezil, and more recently Namenda but unable to tolerate either.

## 2023-08-24 NOTE — PHYSICAL EXAM
[General Appearance - Alert] : alert [General Appearance - In No Acute Distress] : in no acute distress [Oriented To Time, Place, And Person] : oriented to person, place, and time [Impaired Insight] : insight and judgment were intact [Affect] : the affect was normal [Total Score ___ / 30] : the patient achieved a score of [unfilled] /30 [Date / Time ___ / 5] : date / time [unfilled] / 5 [Place ___ / 5] : place [unfilled] / 5 [Registration ___ / 3] : registration [unfilled] / 3 [Serial Sevens ___/5] : serial sevens [unfilled] / 5 [Naming 2 Objects ___ / 2] : naming two objects [unfilled] / 2 [Repeating a Sentence ___ / 1] : repeating a sentence [unfilled] / 1 [Writing a Sentence ___ / 1] : write sentence [unfilled] / 1 [3-stage Verbal Command ___ / 3] : three-stage verbal command [unfilled] / 3 [Written Command ___ / 1] : written command [unfilled] / 1 [Copy a Design ___ / 1] : copy a design [unfilled] / 1 [Recall ___ / 3] : recall [unfilled] / 3 [Cranial Nerves Optic (II)] : visual acuity intact bilaterally,  visual fields full to confrontation, pupils equal round and reactive to light [Cranial Nerves Oculomotor (III)] : extraocular motion intact [Cranial Nerves Trigeminal (V)] : facial sensation intact symmetrically [Cranial Nerves Facial (VII)] : face symmetrical [Cranial Nerves Vestibulocochlear (VIII)] : hearing was intact bilaterally [Cranial Nerves Accessory (XI - Cranial And Spinal)] : head turning and shoulder shrug symmetric [Cranial Nerves Hypoglossal (XII)] : there was no tongue deviation with protrusion [Motor Tone] : muscle tone was normal in all four extremities [Motor Strength] : muscle strength was normal in all four extremities [No Muscle Atrophy] : normal bulk in all four extremities [Motor Handedness Right-Handed] : the patient is right hand dominant [Paresis Pronator Drift Right-Sided] : no pronator drift on the right [Paresis Pronator Drift Left-Sided] : no pronator drift on the left [Sensation Tactile Decrease] : light touch was intact [Abnormal Walk] : normal gait [Balance] : balance was intact [Past-pointing] : there was no past-pointing [Dysdiadochokinesia Bilaterally] : not present [Coordination - Dysmetria Impaired Finger-to-Nose Bilateral] : not present

## 2023-08-24 NOTE — REVIEW OF SYSTEMS
[As Noted in HPI] : as noted in HPI [Memory Lapses or Loss] : memory loss [Decr. Concentrating Ability] : decreased concentrating ability [Difficulty with Language] : ~M difficulty with language [Changed Thought Patterns] : changed thought patterns [Repeating Questions] : repeated questioning about recent events [Negative] : Heme/Lymph

## 2023-08-24 NOTE — DISCUSSION/SUMMARY
[FreeTextEntry1] : 78-year-old woman with a history of difficulty with short-term memory, difficulty with language, findings so far consistent with underlying dementia, unable to tolerate donepezil or memantine. Discussed options. Plan: Start Exelon patch 4.5 mg daily we will titrate if able to tolerate. Advised patient to give me a call back if any problem with the medication or if tolerable after 1 month so we can increase her next dose. Encouraged regular physical activity, remain engaged with family and friends. Return to office, 6 months.

## 2023-10-18 ENCOUNTER — APPOINTMENT (OUTPATIENT)
Dept: NEUROLOGY | Facility: CLINIC | Age: 78
End: 2023-10-18
Payer: MEDICARE

## 2023-10-18 VITALS
BODY MASS INDEX: 19.14 KG/M2 | DIASTOLIC BLOOD PRESSURE: 79 MMHG | WEIGHT: 104 LBS | SYSTOLIC BLOOD PRESSURE: 127 MMHG | HEART RATE: 78 BPM | HEIGHT: 62 IN

## 2023-10-18 PROCEDURE — 99214 OFFICE O/P EST MOD 30 MIN: CPT

## 2023-10-18 RX ORDER — RIVASTIGMINE 4.6 MG/24H
4.6 PATCH, EXTENDED RELEASE TRANSDERMAL DAILY
Qty: 30 | Refills: 1 | Status: DISCONTINUED | COMMUNITY
Start: 2023-08-24 | End: 2023-10-18

## 2023-12-27 ENCOUNTER — NON-APPOINTMENT (OUTPATIENT)
Age: 78
End: 2023-12-27

## 2023-12-28 ENCOUNTER — NON-APPOINTMENT (OUTPATIENT)
Age: 78
End: 2023-12-28

## 2023-12-28 RX ORDER — RIVASTIGMINE TARTRATE 3 MG/1
3 CAPSULE ORAL TWICE DAILY
Qty: 60 | Refills: 1 | Status: DISCONTINUED | COMMUNITY
Start: 2023-10-18 | End: 2023-12-28

## 2024-03-27 RX ORDER — DONEPEZIL HYDROCHLORIDE 10 MG/1
10 TABLET ORAL
Qty: 180 | Refills: 2 | Status: DISCONTINUED | COMMUNITY
End: 2024-03-27

## 2024-03-29 ENCOUNTER — APPOINTMENT (OUTPATIENT)
Age: 79
End: 2024-03-29
Payer: MEDICARE

## 2024-03-29 VITALS
HEIGHT: 62 IN | WEIGHT: 102 LBS | SYSTOLIC BLOOD PRESSURE: 112 MMHG | BODY MASS INDEX: 18.77 KG/M2 | DIASTOLIC BLOOD PRESSURE: 70 MMHG

## 2024-03-29 DIAGNOSIS — K59.00 CONSTIPATION, UNSPECIFIED: ICD-10-CM

## 2024-03-29 DIAGNOSIS — K21.9 GASTRO-ESOPHAGEAL REFLUX DISEASE W/OUT ESOPHAGITIS: ICD-10-CM

## 2024-03-29 DIAGNOSIS — Z12.11 ENCOUNTER FOR SCREENING FOR MALIGNANT NEOPLASM OF COLON: ICD-10-CM

## 2024-03-29 DIAGNOSIS — R14.0 ABDOMINAL DISTENSION (GASEOUS): ICD-10-CM

## 2024-03-29 DIAGNOSIS — R14.1 GAS PAIN: ICD-10-CM

## 2024-03-29 PROCEDURE — 99214 OFFICE O/P EST MOD 30 MIN: CPT

## 2024-03-29 RX ORDER — ALUMINUM CHLORIDE 20 %
20 SOLUTION, NON-ORAL TOPICAL
Qty: 70 | Refills: 0 | Status: DISCONTINUED | COMMUNITY
Start: 2017-07-12 | End: 2024-03-29

## 2024-03-29 RX ORDER — MV-MIN/FOLIC/VIT K/LYCOP/COQ10 200-100MCG
CAPSULE ORAL
Refills: 0 | Status: DISCONTINUED | COMMUNITY
End: 2024-03-29

## 2024-03-29 RX ORDER — CLOBETASOL PROPIONATE 0.5 MG/G
0.05 OINTMENT TOPICAL
Qty: 1 | Refills: 3 | Status: DISCONTINUED | COMMUNITY
Start: 2023-04-19 | End: 2024-03-29

## 2024-03-29 RX ORDER — CLOBETASOL PROPIONATE 0.5 MG/G
0.05 CREAM TOPICAL TWICE DAILY
Qty: 30 | Refills: 6 | Status: DISCONTINUED | COMMUNITY
Start: 2020-01-28 | End: 2024-03-29

## 2024-03-29 RX ORDER — ECONAZOLE NITRATE 10 MG/G
1 CREAM TOPICAL
Qty: 85 | Refills: 0 | Status: DISCONTINUED | COMMUNITY
Start: 2017-05-23 | End: 2024-03-29

## 2024-03-29 RX ORDER — DONEPEZIL HYDROCHLORIDE 10 MG/1
10 TABLET ORAL
Refills: 0 | Status: ACTIVE | COMMUNITY

## 2024-03-29 RX ORDER — PANTOPRAZOLE 40 MG/1
40 TABLET, DELAYED RELEASE ORAL DAILY
Qty: 1 | Refills: 5 | Status: ACTIVE | COMMUNITY
Start: 2024-03-29 | End: 1900-01-01

## 2024-03-29 RX ORDER — SODIUM PICOSULFATE, MAGNESIUM OXIDE, AND ANHYDROUS CITRIC ACID 10; 3.5; 12 MG/160ML; G/160ML; G/160ML
10-3.5-12 MG-GM LIQUID ORAL
Qty: 1 | Refills: 0 | Status: DISCONTINUED | COMMUNITY
Start: 2022-08-12 | End: 2024-03-29

## 2024-03-29 RX ORDER — ESTRADIOL 0.1 MG/G
0.1 CREAM VAGINAL
Qty: 1 | Refills: 3 | Status: DISCONTINUED | COMMUNITY
Start: 2023-04-19 | End: 2024-03-29

## 2024-03-29 NOTE — ASSESSMENT
[FreeTextEntry1] : 79-year-old female presenting for gas, bloating, constipation, and GERD.   Plan:  GERD: Will send patient pantoprazole to try for better relief of symptoms. Discussed with patient can try medication for couple of months and can titrate off when symptoms are more appropriately managed. Pt also encouraged to make dietary changes and to be mindful of exacerbating food choices which include but are not limited to: alcohol, coffee, fried foods, spicy foods, peppers, onions, red sauce, chocolate, etc.  Constipation: Gas and bloating likely related to constipation. Will begin patient on bowel regimen with use of fiber supplement and miralax. Pt instructed miralax can be titrated up to three times a day in episodes of severe constipation.  Pt to call office to follow up with evaluation of symptom relief in 2-3 weeks. Pt to call sooner if symptoms worsen.  Pt agrees to plan, all questions answered. I spent 35 minutes on this encounter.

## 2024-03-29 NOTE — REVIEW OF SYSTEMS
[As Noted in HPI] : as noted in HPI [Abdominal Pain] : no abdominal pain [Vomiting] : no vomiting [Constipation] : constipation [Diarrhea] : no diarrhea [Bleeding] : no bleeding [Heartburn] : heartburn [Melena (black stool)] : no melena [Fecal Incontinence (soiling)] : no fecal incontinence [Bloating (gassiness)] : bloating [Negative] : Endocrine

## 2024-03-29 NOTE — HISTORY OF PRESENT ILLNESS
[FreeTextEntry1] : Christina Jiang is a 79-year-old female presenting to the office for follow up on gas, bloating, and constipation. Pt states when she has a bowel movement only small pellets come out that hard are inducing significant straining when having a bowel movement. Pt also reports having severe GERD, and has been taking multiple tums throughout the day which helps with some relief, but symptoms persist. Previous colonoscopy in 2022 with Dr. Daniels normal.  Denies FMH of CRC. Denies overt bleeding such as melena or hematochezia. Denies nausea, vomiting, or dysphagia. Denies unintentional weight loss.

## 2024-03-29 NOTE — PHYSICAL EXAM
[Alert] : alert [Healthy Appearing] : healthy appearing [Normal Voice/Communication] : normal voice/communication [Sclera] : the sclera and conjunctiva were normal [Hearing Threshold Finger Rub Not [place]] : hearing was normal [Normal Lips/Gums] : the lips and gums were normal [No Respiratory Distress] : no respiratory distress [Normal Appearance] : the appearance of the neck was normal [Auscultation Breath Sounds / Voice Sounds] : lungs were clear to auscultation bilaterally [Heart Rate And Rhythm] : heart rate was normal and rhythm regular [Normal S1, S2] : normal S1 and S2 [Bowel Sounds] : normal bowel sounds [Abdomen Tenderness] : non-tender [Abdomen Soft] : soft [Abnormal Walk] : normal gait [Normal Color / Pigmentation] : normal skin color and pigmentation [Oriented To Time, Place, And Person] : oriented to person, place, and time

## 2024-03-30 ENCOUNTER — INPATIENT (INPATIENT)
Facility: HOSPITAL | Age: 79
LOS: 0 days | Discharge: HOME | DRG: 177 | End: 2024-03-31
Attending: INTERNAL MEDICINE | Admitting: FAMILY MEDICINE
Payer: MEDICARE

## 2024-03-30 VITALS
RESPIRATION RATE: 20 BRPM | DIASTOLIC BLOOD PRESSURE: 103 MMHG | SYSTOLIC BLOOD PRESSURE: 127 MMHG | HEIGHT: 63 IN | WEIGHT: 102.07 LBS | HEART RATE: 85 BPM | OXYGEN SATURATION: 100 %

## 2024-03-30 DIAGNOSIS — U07.1 COVID-19: ICD-10-CM

## 2024-03-30 LAB
ALBUMIN SERPL ELPH-MCNC: 3.7 G/DL — SIGNIFICANT CHANGE UP (ref 3.3–5)
ALP SERPL-CCNC: 83 U/L — SIGNIFICANT CHANGE UP (ref 40–120)
ALT FLD-CCNC: 30 U/L — SIGNIFICANT CHANGE UP (ref 12–78)
ANION GAP SERPL CALC-SCNC: 9 MMOL/L — SIGNIFICANT CHANGE UP (ref 5–17)
APPEARANCE UR: CLEAR — SIGNIFICANT CHANGE UP
APTT BLD: 25.5 SEC — SIGNIFICANT CHANGE UP (ref 24.5–35.6)
AST SERPL-CCNC: 29 U/L — SIGNIFICANT CHANGE UP (ref 15–37)
BACTERIA # UR AUTO: NEGATIVE /HPF — SIGNIFICANT CHANGE UP
BASE EXCESS BLDV CALC-SCNC: 2.3 MMOL/L — SIGNIFICANT CHANGE UP (ref -2–3)
BASOPHILS # BLD AUTO: 0.06 K/UL — SIGNIFICANT CHANGE UP (ref 0–0.2)
BASOPHILS NFR BLD AUTO: 0.9 % — SIGNIFICANT CHANGE UP (ref 0–2)
BILIRUB SERPL-MCNC: 0.7 MG/DL — SIGNIFICANT CHANGE UP (ref 0.2–1.2)
BILIRUB UR-MCNC: NEGATIVE — SIGNIFICANT CHANGE UP
BUN SERPL-MCNC: 22 MG/DL — SIGNIFICANT CHANGE UP (ref 7–23)
CALCIUM SERPL-MCNC: 9 MG/DL — SIGNIFICANT CHANGE UP (ref 8.5–10.1)
CAST: 0 /LPF — SIGNIFICANT CHANGE UP (ref 0–4)
CHLORIDE SERPL-SCNC: 108 MMOL/L — SIGNIFICANT CHANGE UP (ref 96–108)
CO2 SERPL-SCNC: 23 MMOL/L — SIGNIFICANT CHANGE UP (ref 22–31)
COLOR SPEC: YELLOW — SIGNIFICANT CHANGE UP
CREAT SERPL-MCNC: 0.73 MG/DL — SIGNIFICANT CHANGE UP (ref 0.5–1.3)
DIFF PNL FLD: ABNORMAL
EGFR: 84 ML/MIN/1.73M2 — SIGNIFICANT CHANGE UP
EOSINOPHIL # BLD AUTO: 0.17 K/UL — SIGNIFICANT CHANGE UP (ref 0–0.5)
EOSINOPHIL NFR BLD AUTO: 2.7 % — SIGNIFICANT CHANGE UP (ref 0–6)
FLUAV AG NPH QL: SIGNIFICANT CHANGE UP
FLUBV AG NPH QL: SIGNIFICANT CHANGE UP
GLUCOSE SERPL-MCNC: 143 MG/DL — HIGH (ref 70–99)
GLUCOSE UR QL: NEGATIVE MG/DL — SIGNIFICANT CHANGE UP
HCO3 BLDV-SCNC: 25 MMOL/L — SIGNIFICANT CHANGE UP (ref 22–29)
HCT VFR BLD CALC: 40.2 % — SIGNIFICANT CHANGE UP (ref 34.5–45)
HGB BLD-MCNC: 13.6 G/DL — SIGNIFICANT CHANGE UP (ref 11.5–15.5)
IMM GRANULOCYTES NFR BLD AUTO: 0.3 % — SIGNIFICANT CHANGE UP (ref 0–0.9)
INR BLD: 0.93 RATIO — SIGNIFICANT CHANGE UP (ref 0.85–1.18)
KETONES UR-MCNC: ABNORMAL MG/DL
LACTATE SERPL-SCNC: 1.8 MMOL/L — SIGNIFICANT CHANGE UP (ref 0.7–2)
LACTATE SERPL-SCNC: 3.2 MMOL/L — HIGH (ref 0.7–2)
LEUKOCYTE ESTERASE UR-ACNC: ABNORMAL
LYMPHOCYTES # BLD AUTO: 0.95 K/UL — LOW (ref 1–3.3)
LYMPHOCYTES # BLD AUTO: 14.9 % — SIGNIFICANT CHANGE UP (ref 13–44)
MCHC RBC-ENTMCNC: 30.3 PG — SIGNIFICANT CHANGE UP (ref 27–34)
MCHC RBC-ENTMCNC: 33.8 GM/DL — SIGNIFICANT CHANGE UP (ref 32–36)
MCV RBC AUTO: 89.5 FL — SIGNIFICANT CHANGE UP (ref 80–100)
MONOCYTES # BLD AUTO: 0.43 K/UL — SIGNIFICANT CHANGE UP (ref 0–0.9)
MONOCYTES NFR BLD AUTO: 6.8 % — SIGNIFICANT CHANGE UP (ref 2–14)
NEUTROPHILS # BLD AUTO: 4.73 K/UL — SIGNIFICANT CHANGE UP (ref 1.8–7.4)
NEUTROPHILS NFR BLD AUTO: 74.4 % — SIGNIFICANT CHANGE UP (ref 43–77)
NITRITE UR-MCNC: NEGATIVE — SIGNIFICANT CHANGE UP
PCO2 BLDV: 31 MMHG — LOW (ref 39–42)
PH BLDV: 7.51 — HIGH (ref 7.32–7.43)
PH UR: 8 — SIGNIFICANT CHANGE UP (ref 5–8)
PLATELET # BLD AUTO: 204 K/UL — SIGNIFICANT CHANGE UP (ref 150–400)
PO2 BLDV: 62 MMHG — HIGH (ref 25–45)
POTASSIUM SERPL-MCNC: 3.4 MMOL/L — LOW (ref 3.5–5.3)
POTASSIUM SERPL-SCNC: 3.4 MMOL/L — LOW (ref 3.5–5.3)
PROT SERPL-MCNC: 6.9 GM/DL — SIGNIFICANT CHANGE UP (ref 6–8.3)
PROT UR-MCNC: NEGATIVE MG/DL — SIGNIFICANT CHANGE UP
PROTHROM AB SERPL-ACNC: 10.5 SEC — SIGNIFICANT CHANGE UP (ref 9.5–13)
RBC # BLD: 4.49 M/UL — SIGNIFICANT CHANGE UP (ref 3.8–5.2)
RBC # FLD: 13.2 % — SIGNIFICANT CHANGE UP (ref 10.3–14.5)
RBC CASTS # UR COMP ASSIST: 13 /HPF — HIGH (ref 0–4)
RSV RNA NPH QL NAA+NON-PROBE: SIGNIFICANT CHANGE UP
SAO2 % BLDV: 94 % — HIGH (ref 67–88)
SARS-COV-2 RNA SPEC QL NAA+PROBE: DETECTED
SODIUM SERPL-SCNC: 140 MMOL/L — SIGNIFICANT CHANGE UP (ref 135–145)
SP GR SPEC: 1.02 — SIGNIFICANT CHANGE UP (ref 1–1.03)
SQUAMOUS # UR AUTO: 1 /HPF — SIGNIFICANT CHANGE UP (ref 0–5)
TROPONIN I, HIGH SENSITIVITY RESULT: 4.46 NG/L — SIGNIFICANT CHANGE UP
TROPONIN I, HIGH SENSITIVITY RESULT: 4.94 NG/L — SIGNIFICANT CHANGE UP
UROBILINOGEN FLD QL: 0.2 MG/DL — SIGNIFICANT CHANGE UP (ref 0.2–1)
WBC # BLD: 6.36 K/UL — SIGNIFICANT CHANGE UP (ref 3.8–10.5)
WBC # FLD AUTO: 6.36 K/UL — SIGNIFICANT CHANGE UP (ref 3.8–10.5)
WBC UR QL: 3 /HPF — SIGNIFICANT CHANGE UP (ref 0–5)

## 2024-03-30 PROCEDURE — 81001 URINALYSIS AUTO W/SCOPE: CPT

## 2024-03-30 PROCEDURE — 85027 COMPLETE CBC AUTOMATED: CPT

## 2024-03-30 PROCEDURE — 93010 ELECTROCARDIOGRAM REPORT: CPT | Mod: 76

## 2024-03-30 PROCEDURE — 71045 X-RAY EXAM CHEST 1 VIEW: CPT | Mod: 26

## 2024-03-30 PROCEDURE — 86803 HEPATITIS C AB TEST: CPT

## 2024-03-30 PROCEDURE — 80048 BASIC METABOLIC PNL TOTAL CA: CPT

## 2024-03-30 PROCEDURE — 71260 CT THORAX DX C+: CPT | Mod: 26,MC

## 2024-03-30 PROCEDURE — 36415 COLL VENOUS BLD VENIPUNCTURE: CPT

## 2024-03-30 PROCEDURE — 87086 URINE CULTURE/COLONY COUNT: CPT

## 2024-03-30 PROCEDURE — 93005 ELECTROCARDIOGRAM TRACING: CPT

## 2024-03-30 PROCEDURE — 74177 CT ABD & PELVIS W/CONTRAST: CPT | Mod: 26,MC

## 2024-03-30 PROCEDURE — 99222 1ST HOSP IP/OBS MODERATE 55: CPT

## 2024-03-30 PROCEDURE — 99285 EMERGENCY DEPT VISIT HI MDM: CPT

## 2024-03-30 RX ORDER — LEVOTHYROXINE SODIUM 125 MCG
88 TABLET ORAL DAILY
Refills: 0 | Status: DISCONTINUED | OUTPATIENT
Start: 2024-03-30 | End: 2024-03-31

## 2024-03-30 RX ORDER — PIPERACILLIN AND TAZOBACTAM 4; .5 G/20ML; G/20ML
3.38 INJECTION, POWDER, LYOPHILIZED, FOR SOLUTION INTRAVENOUS ONCE
Refills: 0 | Status: COMPLETED | OUTPATIENT
Start: 2024-03-30 | End: 2024-03-30

## 2024-03-30 RX ORDER — ENOXAPARIN SODIUM 100 MG/ML
40 INJECTION SUBCUTANEOUS EVERY 24 HOURS
Refills: 0 | Status: DISCONTINUED | OUTPATIENT
Start: 2024-03-30 | End: 2024-03-31

## 2024-03-30 RX ORDER — POTASSIUM CHLORIDE 20 MEQ
20 PACKET (EA) ORAL ONCE
Refills: 0 | Status: COMPLETED | OUTPATIENT
Start: 2024-03-30 | End: 2024-03-30

## 2024-03-30 RX ORDER — SODIUM CHLORIDE 9 MG/ML
1450 INJECTION, SOLUTION INTRAVENOUS ONCE
Refills: 0 | Status: COMPLETED | OUTPATIENT
Start: 2024-03-30 | End: 2024-03-30

## 2024-03-30 RX ORDER — ATORVASTATIN CALCIUM 80 MG/1
20 TABLET, FILM COATED ORAL AT BEDTIME
Refills: 0 | Status: DISCONTINUED | OUTPATIENT
Start: 2024-03-30 | End: 2024-03-31

## 2024-03-30 RX ORDER — PIPERACILLIN AND TAZOBACTAM 4; .5 G/20ML; G/20ML
3.38 INJECTION, POWDER, LYOPHILIZED, FOR SOLUTION INTRAVENOUS EVERY 8 HOURS
Refills: 0 | Status: DISCONTINUED | OUTPATIENT
Start: 2024-03-30 | End: 2024-03-31

## 2024-03-30 RX ORDER — ONDANSETRON 8 MG/1
4 TABLET, FILM COATED ORAL EVERY 8 HOURS
Refills: 0 | Status: DISCONTINUED | OUTPATIENT
Start: 2024-03-30 | End: 2024-03-31

## 2024-03-30 RX ORDER — AMLODIPINE BESYLATE 2.5 MG/1
5 TABLET ORAL DAILY
Refills: 0 | Status: DISCONTINUED | OUTPATIENT
Start: 2024-03-30 | End: 2024-03-31

## 2024-03-30 RX ORDER — VANCOMYCIN HCL 1 G
1000 VIAL (EA) INTRAVENOUS ONCE
Refills: 0 | Status: COMPLETED | OUTPATIENT
Start: 2024-03-30 | End: 2024-03-30

## 2024-03-30 RX ORDER — ACETAMINOPHEN 500 MG
650 TABLET ORAL ONCE
Refills: 0 | Status: DISCONTINUED | OUTPATIENT
Start: 2024-03-30 | End: 2024-03-31

## 2024-03-30 RX ORDER — DONEPEZIL HYDROCHLORIDE 10 MG/1
10 TABLET, FILM COATED ORAL AT BEDTIME
Refills: 0 | Status: DISCONTINUED | OUTPATIENT
Start: 2024-03-30 | End: 2024-03-31

## 2024-03-30 RX ORDER — LANOLIN ALCOHOL/MO/W.PET/CERES
3 CREAM (GRAM) TOPICAL AT BEDTIME
Refills: 0 | Status: DISCONTINUED | OUTPATIENT
Start: 2024-03-30 | End: 2024-03-31

## 2024-03-30 RX ADMIN — PIPERACILLIN AND TAZOBACTAM 25 GRAM(S): 4; .5 INJECTION, POWDER, LYOPHILIZED, FOR SOLUTION INTRAVENOUS at 14:23

## 2024-03-30 RX ADMIN — Medication 20 MILLIEQUIVALENT(S): at 06:26

## 2024-03-30 RX ADMIN — Medication 250 MILLIGRAM(S): at 06:24

## 2024-03-30 RX ADMIN — PIPERACILLIN AND TAZOBACTAM 200 GRAM(S): 4; .5 INJECTION, POWDER, LYOPHILIZED, FOR SOLUTION INTRAVENOUS at 06:24

## 2024-03-30 RX ADMIN — DONEPEZIL HYDROCHLORIDE 10 MILLIGRAM(S): 10 TABLET, FILM COATED ORAL at 20:50

## 2024-03-30 RX ADMIN — PIPERACILLIN AND TAZOBACTAM 25 GRAM(S): 4; .5 INJECTION, POWDER, LYOPHILIZED, FOR SOLUTION INTRAVENOUS at 20:50

## 2024-03-30 RX ADMIN — ATORVASTATIN CALCIUM 20 MILLIGRAM(S): 80 TABLET, FILM COATED ORAL at 20:50

## 2024-03-30 RX ADMIN — ENOXAPARIN SODIUM 40 MILLIGRAM(S): 100 INJECTION SUBCUTANEOUS at 14:24

## 2024-03-30 RX ADMIN — SODIUM CHLORIDE 1450 MILLILITER(S): 9 INJECTION, SOLUTION INTRAVENOUS at 05:23

## 2024-03-30 NOTE — H&P ADULT - HISTORY OF PRESENT ILLNESS
HPI: The patient is a 78 yo female with Hx. of  Asthma, HTN, was BIBA  with symptoms of SOB, chills , nausea. The patient states she started having nausea and lower abdominal cramps for few days. Denies sick contacts.     PMHx: HTN,  HLD,  Asthma, Hypothyroidism, Osteogenic [place]     PSHx: unavailable    Family Hx: father CAD,    Social Hx.: not smoking, no alcohol use

## 2024-03-30 NOTE — ED PROVIDER NOTE - PROGRESS NOTE DETAILS
CC:  Signout received from Dr. David at shift change to f/u CT reports, admit pt for sepsis, hypothermia.  CT C/A/P report: + patchy groundglass opacities RLL.  Urine still pending.  Case d/w Dr. Escobar & accepted for Med admit, COVID precautions.  EKG + LBBB not present on priors, pt w/o cp, SOB.

## 2024-03-30 NOTE — PATIENT PROFILE ADULT - FALL HARM RISK - UNIVERSAL INTERVENTIONS
Plan/Recommendations:   1. Initiate speech therapy twice per week, 30 minute individual sessions, with a home program to address long-term and short-term goals described below.   2. Pursue Early Steps for an evaluation and intervention in the home.  3. Continue peer stimulation via family/friends.  4. Continued home stimulation family.   5. Continued follow-up with referring physician and/or PCP as needed for medical care/management.  6. Contact the provider at [phone] with any further questions or concerns.        
Bed in lowest position, wheels locked, appropriate side rails in place/Call bell, personal items and telephone in reach/Instruct patient to call for assistance before getting out of bed or chair/Non-slip footwear when patient is out of bed/[place] to call system/Physically safe environment - no spills, clutter or unnecessary equipment/Purposeful Proactive Rounding/Room/bathroom lighting operational, light cord in reach

## 2024-03-30 NOTE — ED PROVIDER NOTE - DIFFERENTIAL DIAGNOSIS
cough, sob, chills, r/o PNA, abdominal or thoracic pathology, CT, labs, admission. Signed out the care of the patient to Dr. Ledesma. Differential Diagnosis

## 2024-03-30 NOTE — PATIENT PROFILE ADULT - NSPROGENSOURCEINFO_GEN_A_NUR
patient Full Thickness Lip Wedge Repair (Flap) Text: Given the location of the defect and the proximity to free margins a full thickness wedge repair was deemed most appropriate.  Using a sterile surgical marker, the appropriate repair was drawn incorporating the defect and placing the expected incisions perpendicular to the vermilion border.  The vermilion border was also meticulously outlined to ensure appropriate reapproximation during the repair.  The area thus outlined was incised through and through with a #15 scalpel blade.  The muscularis and dermis were reaproximated with deep sutures following hemostasis. Care was taken to realign the vermilion border before proceeding with the superficial closure.  Once the vermilion was realigned the superfical and mucosal closure was finished.

## 2024-03-30 NOTE — H&P ADULT - NSHPREVIEWOFSYSTEMS_GEN_ALL_CORE
ROS: as in HPI   Eyes: no changes in vision  ENT/Mouth: no changes    Cardiovascular: no chest pain    Respiratory: had  SOB,     Gastrointestinal: urine incontinence, lower abdominal cramps.    Genitourinary: has urine incontinence    Breast: no pain    Musculoskeletal: no pain    Integumentary: no itching    Neurological: No Headache, no tremor,    Endocrine: no excessive thirst,     Allergic/Immunologic: no itching

## 2024-03-30 NOTE — CONSULT NOTE ADULT - SUBJECTIVE AND OBJECTIVE BOX
Patient is a 79y old  Female who presents with a chief complaint of Pneumonia, COVID 19     HPI:  78 y/o female with h/o Asthma, HTN was admitted on 3/30 for worsening SOB, chills, nausea. The patient states she started having nausea and lower abdominal cramps for few days. Denies sick contacts. In ER she received zosyn.     PMHx: HTN, HLD,  Asthma, Hypothyroidism, Osteogenic [place]   PSH: as above    Meds: per reconciliation sheet, noted below  MEDICATIONS  (STANDING):  amLODIPine   Tablet 5 milliGRAM(s) Oral daily  artificial  tears Solution 1 Drop(s) Both EYES two times a day  atorvastatin 20 milliGRAM(s) Oral at bedtime  donepezil 10 milliGRAM(s) Oral at bedtime  enoxaparin Injectable 40 milliGRAM(s) SubCutaneous every 24 hours  levothyroxine 88 MICROGram(s) Oral daily  piperacillin/tazobactam IVPB.. 3.375 Gram(s) IV Intermittent every 8 hours    MEDICATIONS  (PRN):  acetaminophen     Tablet .. 650 milliGRAM(s) Oral once PRN Temp greater or equal to 38C (100.4F), Mild Pain (1 - 3)  aluminum hydroxide/magnesium hydroxide/simethicone Suspension 30 milliLiter(s) Oral every 4 hours PRN Dyspepsia  melatonin 3 milliGRAM(s) Oral at bedtime PRN Insomnia  ondansetron Injectable 4 milliGRAM(s) IV Push every 8 hours PRN Nausea and/or Vomiting    Allergies    sulfa drugs (Unknown)    Intolerances    Social: no smoking, no alcohol, no illegal drugs; no recent travel, no exposure to TB  FAMILY HISTORY:    no history of premature cardiovascular disease in first degree relatives    ROS: the patient denies fever, no chills, no HA, no seizures, no dizziness, no sore throat, no nasal congestion, no blurry vision, no CP, no palpitations, has SOB, has cough, no abdominal pain, no diarrhea, had N/V, no dysuria, no leg pain, no claudication, no rash, no joint aches, no rectal pain or bleeding, no night sweats  All other systems reviewed and are negative    Vital Signs Last 24 Hrs  T(C): 37 (30 Mar 2024 11:26), Max: 37 (30 Mar 2024 11:26)  T(F): 98.6 (30 Mar 2024 11:26), Max: 98.6 (30 Mar 2024 11:26)  HR: 70 (30 Mar 2024 11:26) (70 - 96)  BP: 135/79 (30 Mar 2024 11:26) (113/83 - 136/85)  BP(mean): 87 (30 Mar 2024 08:08) (87 - 102)  RR: 18 (30 Mar 2024 11:26) (16 - 20)  SpO2: 98% (30 Mar 2024 11:26) (96% - 100%)    Parameters below as of 30 Mar 2024 11:26  Patient On (Oxygen Delivery Method): room air      Daily Height in cm: 160.02 (30 Mar 2024 11:26)    Daily     PE:    Constitutional:  No acute distress  HEENT: NC/AT, EOMI, PERRLA, conjunctivae clear; ears and nose atraumatic; pharynx benign  Neck: supple; thyroid not palpable  Back: no tenderness  Respiratory: respiratory effort normal; crackles at bases  Cardiovascular: S1S2 regular, no murmurs  Abdomen: soft, not tender, not distended, positive BS; no liver or spleen organomegaly  Genitourinary: no suprapubic tenderness  Lymphatic: no LN palpable  Musculoskeletal: no muscle tenderness, no joint swelling or tenderness  Extremities: no pedal edema  Neurological/ Psychiatric: AxOx3, judgement and insight normal; moving all extremities  Skin: no rashes; no palpable lesions    Labs: all available labs reviewed                        13.6   6.36  )-----------( 204      ( 30 Mar 2024 05:18 )             40.2     03-30    140  |  108  |  22  ----------------------------<  143<H>  3.4<L>   |  23  |  0.73    Ca    9.0      30 Mar 2024 05:18    TPro  6.9  /  Alb  3.7  /  TBili  0.7  /  DBili  x   /  AST  29  /  ALT  30  /  AlkPhos  83  03-30     LIVER FUNCTIONS - ( 30 Mar 2024 05:18 )  Alb: 3.7 g/dL / Pro: 6.9 gm/dL / ALK PHOS: 83 U/L / ALT: 30 U/L / AST: 29 U/L / GGT: x           Urinalysis (03-30 @ 09:15)  Urine Appearance: Clear  Protein, Urine: Negative mg/dL  Urine Microscopic-Add On (NC) (03-30 @ 09:15)  White Blood Cell - Urine: 3 /HPF  Red Blood Cell - Urine: 13 /HPF    Radiology: all available radiological tests reviewed    < from: CT Chest w/ IV Cont (03.30.24 @ 07:40) >  1. Mild patchy groundglass opacity at the right lung base which may represent mild pneumonitis.  2. Patulous fluid and food distended esophagus, suggestive of gastroesophageal reflux.  3. Small bilateral nonobstructing renal stones.  < end of copied text >    Advanced directives addressed: full resuscitation

## 2024-03-30 NOTE — ED PROVIDER NOTE - DISPOSITION TYPE
GI consulted, appreciate recs  Patient experiencing RUQ and epigastric pain   Likely source of sepsis ADMIT

## 2024-03-30 NOTE — CONSULT NOTE ADULT - ASSESSMENT
78 y/o female with h/o Asthma, HTN was admitted on 3/30 for worsening SOB, chills, nausea. The patient states she started having nausea and lower abdominal cramps for few days. Denies sick contacts. In ER she received zosyn.    1. Possible aspiration pneumonia. Pneumonitis. COVID-19 viral syndrome. GERD. Allergy to sulfa.  -has SOB and cough  -no hypoxia noted  -obtain BC x 2  -start zosyn 3.375 gm IV q8h  -reason for abx use and side effects reviewed with patient; monitor BMP   -no need for antiviral therapy at this time  -respiratory care  -droplet isolation  -old chart reviewed to assess prior cultures  -monitor temps  -f/u CBC  -supportive care  2. Other issues:   -care per medicine    Clinical team may change from intravenous to oral antibiotics when the following criteria are met:   1. Patient is clinically improving/stable       a)	Improved signs and symptoms of infection from initial presentation       b)	Afebrile for 24 hours       c)	Leukocytosis trending towards normal range   2. Patient is tolerating oral intake   3. Initial/repeat blood cultures are negative     When above criteria met may change iv antibiotics to an appropriate oral abx agent  Cannot advise changing to oral antibiotic therapy until culture sensitivity is available.

## 2024-03-30 NOTE — H&P ADULT - NSHPPHYSICALEXAM_GEN_ALL_CORE
Physical Exam: Vital Signs Last 24 Hrs  T(C): 36.3 (30 Mar 2024 10:15), Max: 36.7 (30 Mar 2024 06:38)  T(F): 97.4 (30 Mar 2024 10:15), Max: 98.1 (30 Mar 2024 06:38)  HR: 96 (30 Mar 2024 10:15) (85 - 96)  BP: 113/83 (30 Mar 2024 10:15) (113/83 - 136/85)  BP(mean): 87 (30 Mar 2024 08:08) (87 - 102)  RR: 20 (30 Mar 2024 10:15) (16 - 20)  SpO2: 100% (30 Mar 2024 10:15) (96% - 100%)    Parameters below as of 30 Mar 2024 10:15  Patient On (Oxygen Delivery Method): room air            HEENT: PRRL EOMI    MOUTH/TEETH/GUMS: Clear    NECK: no JVD    LUNGS: mild left base rales     HEART: S1,S2 RR    ABDOMEN: soft nontender    EXTREMITIES:  no pedal edema    MUSCULOSKELETAL: no joint swelling     NEURO: no tremor, no focal signs.    SKIN: no rash    : CVA negative,

## 2024-03-30 NOTE — H&P ADULT - ASSESSMENT
78 yo female with Hx. of  Asthma, HTN, was BIBA  with symptoms of SOB, chills , nausea. The patient states she started having nausea and lower abdominal cramps for few days. Denies sick contacts.   assessment Dx:                       1. Pneumonia                        2. COVID 19 infection                       3. Hypothermia  r/o sepsis                        4. Urine incontinence  r/o UTI                       5. Kidney stones                        6. GERD                       7. Hypokalemia     Plan:    admit to medicine                medications: started on IV Zosyn and Vancomycin               VTEP: Lovenox                Labs: cbc,bmp               Radiology: CT chest /abd               Cardiac diagnostics:                Consults: ID                 Advance Directive: full code,

## 2024-03-30 NOTE — ED PROVIDER NOTE - CLINICAL SUMMARY MEDICAL DECISION MAKING FREE TEXT BOX
cough, sob, chills, r/o PNA, abdominal or thoracic pathology, CT, labs, admission. Signed out the care of the patient to Dr. Ledesma.

## 2024-03-30 NOTE — ED ADULT TRIAGE NOTE - CHIEF COMPLAINT QUOTE
Patient BIB ems c/o shaking and sob that started when she woke up this morning. patient's spo2 86% on roomair when ems arrived; spo2 100% on roomair in triage. Endorsing chills, nausea. Denies vomiting, fever.

## 2024-03-30 NOTE — H&P ADULT - NSHPLABSRESULTS_GEN_ALL_CORE
13.6   6.36  )-----------( 204      ( 30 Mar 2024 05:18 )             40.2       03-30    140  |  108  |  22  ----------------------------<  143<H>  3.4<L>   |  23  |  0.73    Ca    9.0      30 Mar 2024 05:18    TPro  6.9  /  Alb  3.7  /  TBili  0.7  /  DBili  x   /  AST  29  /  ALT  30  /  AlkPhos  83  03-30    CT abd/pelvis : 1. Mild patchy groundglass opacity at the right lung base which may   represent mild pneumonitis.  2. Patulous fluid and food distended esophagus, suggestive of   gastroesophageal reflux.  3. Small bilateral nonobstructing renal stones.    COVID 19 positive .

## 2024-03-30 NOTE — ED ADULT NURSE NOTE - OBJECTIVE STATEMENT
pt. came in [facility] from home with c/o shivering and SOB started this morning, with diarrhea and vomiting since yesterday, denies fever, saturating at 100% in room air, alert and oriented, afebrile, known HTN, cholesterol, hypothermic with temp. 95.5 rectally, placed on bear hugger, safety maintained applied.

## 2024-03-30 NOTE — ED PROVIDER NOTE - OBJECTIVE STATEMENT
79 year old female with PMH of HTN, HLD, HypoT, sarcoma presents to the ED with cc of SOB. Also complains of chills and lower abdominal cramping. No neck stiffness.

## 2024-03-30 NOTE — ED PROVIDER NOTE - CARE PLAN
1 Principal Discharge DX:	Pneumonia due to COVID-19 virus  Secondary Diagnosis:	Hypothermia not due to cold exposure  Secondary Diagnosis:	Suspected UTI  Secondary Diagnosis:	Sepsis, unspecified organism

## 2024-03-30 NOTE — ED ADULT NURSE NOTE - NSFALLHARMRISKINTERV_ED_ALL_ED

## 2024-03-31 ENCOUNTER — TRANSCRIPTION ENCOUNTER (OUTPATIENT)
Age: 79
End: 2024-03-31

## 2024-03-31 VITALS
TEMPERATURE: 98 F | HEART RATE: 78 BPM | RESPIRATION RATE: 16 BRPM | SYSTOLIC BLOOD PRESSURE: 122 MMHG | DIASTOLIC BLOOD PRESSURE: 78 MMHG | OXYGEN SATURATION: 97 %

## 2024-03-31 LAB
ANION GAP SERPL CALC-SCNC: 4 MMOL/L — LOW (ref 5–17)
BUN SERPL-MCNC: 17 MG/DL — SIGNIFICANT CHANGE UP (ref 7–23)
CALCIUM SERPL-MCNC: 8.8 MG/DL — SIGNIFICANT CHANGE UP (ref 8.5–10.1)
CHLORIDE SERPL-SCNC: 109 MMOL/L — HIGH (ref 96–108)
CO2 SERPL-SCNC: 27 MMOL/L — SIGNIFICANT CHANGE UP (ref 22–31)
CREAT SERPL-MCNC: 0.76 MG/DL — SIGNIFICANT CHANGE UP (ref 0.5–1.3)
CULTURE RESULTS: SIGNIFICANT CHANGE UP
EGFR: 80 ML/MIN/1.73M2 — SIGNIFICANT CHANGE UP
GLUCOSE SERPL-MCNC: 99 MG/DL — SIGNIFICANT CHANGE UP (ref 70–99)
HCT VFR BLD CALC: 37.7 % — SIGNIFICANT CHANGE UP (ref 34.5–45)
HCV AB S/CO SERPL IA: 0.11 S/CO — SIGNIFICANT CHANGE UP (ref 0–0.99)
HCV AB SERPL-IMP: SIGNIFICANT CHANGE UP
HGB BLD-MCNC: 12.7 G/DL — SIGNIFICANT CHANGE UP (ref 11.5–15.5)
MCHC RBC-ENTMCNC: 30.8 PG — SIGNIFICANT CHANGE UP (ref 27–34)
MCHC RBC-ENTMCNC: 33.7 GM/DL — SIGNIFICANT CHANGE UP (ref 32–36)
MCV RBC AUTO: 91.3 FL — SIGNIFICANT CHANGE UP (ref 80–100)
PLATELET # BLD AUTO: 198 K/UL — SIGNIFICANT CHANGE UP (ref 150–400)
POTASSIUM SERPL-MCNC: 3.5 MMOL/L — SIGNIFICANT CHANGE UP (ref 3.5–5.3)
POTASSIUM SERPL-SCNC: 3.5 MMOL/L — SIGNIFICANT CHANGE UP (ref 3.5–5.3)
RBC # BLD: 4.13 M/UL — SIGNIFICANT CHANGE UP (ref 3.8–5.2)
RBC # FLD: 13.4 % — SIGNIFICANT CHANGE UP (ref 10.3–14.5)
SODIUM SERPL-SCNC: 140 MMOL/L — SIGNIFICANT CHANGE UP (ref 135–145)
SPECIMEN SOURCE: SIGNIFICANT CHANGE UP
WBC # BLD: 5.86 K/UL — SIGNIFICANT CHANGE UP (ref 3.8–10.5)
WBC # FLD AUTO: 5.86 K/UL — SIGNIFICANT CHANGE UP (ref 3.8–10.5)

## 2024-03-31 PROCEDURE — 99239 HOSP IP/OBS DSCHRG MGMT >30: CPT

## 2024-03-31 RX ORDER — LACTOBACILLUS ACIDOPHILUS 100MM CELL
2 CAPSULE ORAL
Qty: 60 | Refills: 0
Start: 2024-03-31 | End: 2024-04-29

## 2024-03-31 RX ORDER — PANTOPRAZOLE SODIUM 20 MG/1
1 TABLET, DELAYED RELEASE ORAL
Qty: 30 | Refills: 0
Start: 2024-03-31 | End: 2024-04-29

## 2024-03-31 RX ADMIN — PIPERACILLIN AND TAZOBACTAM 25 GRAM(S): 4; .5 INJECTION, POWDER, LYOPHILIZED, FOR SOLUTION INTRAVENOUS at 05:38

## 2024-03-31 RX ADMIN — Medication 1 DROP(S): at 11:11

## 2024-03-31 RX ADMIN — AMLODIPINE BESYLATE 5 MILLIGRAM(S): 2.5 TABLET ORAL at 11:11

## 2024-03-31 RX ADMIN — Medication 88 MICROGRAM(S): at 05:38

## 2024-03-31 NOTE — DISCHARGE NOTE PROVIDER - HOSPITAL COURSE
PHYSICAL EXAM:    Daily     Daily     Vital Signs Last 24 Hrs  T(C): 36.7 (31 Mar 2024 09:50), Max: 37.1 (30 Mar 2024 15:45)  T(F): 98 (31 Mar 2024 09:50), Max: 98.8 (30 Mar 2024 15:45)  HR: 78 (31 Mar 2024 09:50) (70 - 78)  BP: 122/78 (31 Mar 2024 09:50) (122/78 - 141/84)  BP(mean): --  RR: 16 (31 Mar 2024 09:50) (16 - 18)  SpO2: 97% (31 Mar 2024 09:50) (96% - 99%)    Constitutional: Well  appearing  HEENT: Atraumatic, NEHEMIAH, Normal, No congestion  Respiratory: Breath Sounds normal, no rhonchi/wheeze  Cardiovascular: N S1S2;   Gastrointestinal: Abdomen soft, non tender, Bowel Sounds present  Extremities: No edema, peripheral pulses present  Neurological: AAO x 3, no gross focal motor deficits  Skin: Non cellulitic, no rash, ulcers  Lymph Nodes: No lymphadenopathy noted  Back: No CVA tenderness   Musculoskeletal: non tender  Breasts: Deferred  Genitourinary: deferred  Rectal: Deferred    80 yo female with Hx. of  Asthma, HTN, was BIBA  with symptoms of SOB, chills , nausea. The patient states she started having nausea and lower abdominal cramps for few days. Denies sick contacts.   assessment Dx:                       1. Pneumonitis but NO Pneumonia                        2. COVID 19 infection                       3. Hypothermia  , resolved; no sepsis                       4. Urine incontinence, No UTI                       5. Kidney stones                        6. GERD                       7. Hypokalemia , resolved    Plan:    admitted to medicine               Augmentin for 7 days             ID consult appreciated             protonix daily             bacid daily  Pt eager to go home    d/c home     time spent 45 min

## 2024-03-31 NOTE — DISCHARGE NOTE PROVIDER - CARE PROVIDER_API CALL
[place] [address]  Phone: [phone]  Fax: [phone]  Follow Up Time:

## 2024-03-31 NOTE — DISCHARGE NOTE PROVIDER - NSDCMRMEDTOKEN_GEN_ALL_CORE_FT
amLODIPine 5 mg oral tablet: 1 tab(s) orally once a day (in the morning)  amoxicillin-clavulanate 875 mg-125 mg oral tablet: 1 tab(s) orally 2 times a day  donepezil 10 mg oral tablet: 1 tab(s) orally once a day (in the evening)  lactobacillus acidophilus oral tablet: 2 tab(s) orally once a day  levothyroxine 88 mcg (0.088 mg) oral tablet: 1 tab(s) orally once a day (in the morning)  Protonix 40 mg oral delayed release tablet: 1 tab(s) orally once a day  Refresh ophthalmic solution: 1 drop(s) in each eye 2 times a day as needed for  rivastigmine 1.5 mg oral capsule: 1 cap(s) orally 2 times a day  rosuvastatin 5 mg oral tablet: 1 tab(s) orally once a day (at bedtime)

## 2024-03-31 NOTE — DISCHARGE NOTE PROVIDER - NSDCFUSCHEDAPPT_GEN_ALL_CORE_FT
Solo Barrios  [facility]  [facility] [address]  Scheduled Appointment: 05/21/2024

## 2024-03-31 NOTE — DISCHARGE NOTE NURSING/CASE MANAGEMENT/SOCIAL WORK - NSDPACMPNY_GEN_ALL_CORE
Spouse [No Acute Distress] : no acute distress [Well Nourished] : well nourished [Well Developed] : well developed [Well-Appearing] : well-appearing [Normal Sclera/Conjunctiva] : normal sclera/conjunctiva [PERRL] : pupils equal round and reactive to light [EOMI] : extraocular movements intact [Normal Outer Ear/Nose] : the outer ears and nose were normal in appearance [Normal Oropharynx] : the oropharynx was normal [Normal TMs] : both tympanic membranes were normal [No JVD] : no jugular venous distention [Supple] : supple [No Lymphadenopathy] : no lymphadenopathy [Thyroid Normal, No Nodules] : the thyroid was normal and there were no nodules present [No Respiratory Distress] : no respiratory distress  [Clear to Auscultation] : lungs were clear to auscultation bilaterally [No Accessory Muscle Use] : no accessory muscle use [Normal Percussion] : the chest was normal to percussion [Normal Rate] : normal rate  [Regular Rhythm] : with a regular rhythm [Normal S1, S2] : normal S1 and S2 [No Murmur] : no murmur heard [No Carotid Bruits] : no carotid bruits [No Abdominal Bruit] : a ~M bruit was not heard ~T in the abdomen [No Varicosities] : no varicosities [Pedal Pulses Present] : the pedal pulses are present [No Edema] : there was no peripheral edema [No Extremity Clubbing/Cyanosis] : no extremity clubbing/cyanosis [No Palpable Aorta] : no palpable aorta [Normal Appearance] : normal in appearance [No Nipple Discharge] : no nipple discharge [No Axillary Lymphadenopathy] : no axillary lymphadenopathy [Soft] : abdomen soft [Non Tender] : non-tender [Non-distended] : non-distended [No Masses] : no abdominal mass palpated [No HSM] : no HSM [Normal Bowel Sounds] : normal bowel sounds [No Hernias] : no hernias [Declined Rectal Exam] : declined rectal exam [Normal Supraclavicular Nodes] : no supraclavicular lymphadenopathy [Normal Axillary Nodes] : no axillary lymphadenopathy [Normal Posterior Cervical Nodes] : no posterior cervical lymphadenopathy [Normal Femoral Nodes] : no femoral lymphadenopathy [Normal Anterior Cervical Nodes] : no anterior cervical lymphadenopathy [Normal Inguinal Nodes] : no inguinal lymphadenopathy [No CVA Tenderness] : no CVA  tenderness [No Spinal Tenderness] : no spinal tenderness [Kyphosis] : no kyphosis [Scoliosis] : no scoliosis [No Joint Swelling] : no joint swelling [Grossly Normal Strength/Tone] : grossly normal strength/tone [No Rash] : no rash [No Skin Lesions] : no skin lesions [Acne] : no acne [Normal Gait] : normal gait [Coordination Grossly Intact] : coordination grossly intact [No Focal Deficits] : no focal deficits [Deep Tendon Reflexes (DTR)] : deep tendon reflexes were 2+ and symmetric [Speech Grossly Normal] : speech grossly normal [Memory Grossly Normal] : memory grossly normal [Normal Affect] : the affect was normal [Alert and Oriented x3] : oriented to person, place, and time [Normal Mood] : the mood was normal [Normal Insight/Judgement] : insight and judgment were intact

## 2024-03-31 NOTE — DISCHARGE NOTE PROVIDER - NSDCCPCAREPLAN_GEN_ALL_CORE_FT
PRINCIPAL DISCHARGE DIAGNOSIS  Diagnosis: Aspiration pneumonitis  Assessment and Plan of Treatment: augmentin for 7 days  f/u with PCP      SECONDARY DISCHARGE DIAGNOSES  Diagnosis: COVID-19 virus infection  Assessment and Plan of Treatment: covid precautions, isoaltion, face mask

## 2024-03-31 NOTE — DISCHARGE NOTE NURSING/CASE MANAGEMENT/SOCIAL WORK - PATIENT PORTAL LINK FT
You can access the FollowMyHealth Patient Portal offered by [facility] by registering at the following website: [email or web address]. By joining [employer]’s FollowMyHealth portal, you will also be able to view your health information using other applications (apps) compatible with our system.

## 2024-03-31 NOTE — PROGRESS NOTE ADULT - ASSESSMENT
78 y/o female with h/o Asthma, HTN was admitted on 3/30 for worsening SOB, chills, nausea. The patient states she started having nausea and lower abdominal cramps for few days. Denies sick contacts. In ER she received zosyn.    1. Possible aspiration pneumonia. Pneumonitis. COVID-19 viral syndrome. GERD. Allergy to sulfa.  -has SOB and cough  -no hypoxia noted  -BC x 2 noted  -on zosyn 3.375 gm IV q8h # 2  -tolerating abx well so far; no side effects noted  -no need for antiviral therapy at this time  -respiratory care  -droplet isolation  -change abx to augmentin 875 mg PO q12h for 7 more days  -monitor temps  -f/u CBC  -supportive care  2. Other issues:   -care per medicine    Clinical team may change from intravenous to oral antibiotics when the following criteria are met:   1. Patient is clinically improving/stable       a)	Improved signs and symptoms of infection from initial presentation       b)	Afebrile for 24 hours       c)	Leukocytosis trending towards normal range   2. Patient is tolerating oral intake   3. Initial/repeat blood cultures are negative     When above criteria met may change iv antibiotics to augmentin PO

## 2024-03-31 NOTE — PROGRESS NOTE ADULT - SUBJECTIVE AND OBJECTIVE BOX
Date of service: 03-31-24 @ 11:11    Lying in bed in NAD  No SOB at rest  Has dry cough  No fever    ROS: no fever or chills; denies dizziness, no HA, no abdominal pain, no diarrhea or constipation; no dysuria, no legs pain, no rashes    MEDICATIONS  (STANDING):  amLODIPine   Tablet 5 milliGRAM(s) Oral daily  artificial  tears Solution 1 Drop(s) Both EYES two times a day  atorvastatin 20 milliGRAM(s) Oral at bedtime  donepezil 10 milliGRAM(s) Oral at bedtime  enoxaparin Injectable 40 milliGRAM(s) SubCutaneous every 24 hours  levothyroxine 88 MICROGram(s) Oral daily  piperacillin/tazobactam IVPB.. 3.375 Gram(s) IV Intermittent every 8 hours    Vital Signs Last 24 Hrs  T(C): 36.7 (31 Mar 2024 09:50), Max: 37.1 (30 Mar 2024 15:45)  T(F): 98 (31 Mar 2024 09:50), Max: 98.8 (30 Mar 2024 15:45)  HR: 78 (31 Mar 2024 09:50) (70 - 78)  BP: 122/78 (31 Mar 2024 09:50) (122/78 - 141/84)  BP(mean): --  RR: 16 (31 Mar 2024 09:50) (16 - 18)  SpO2: 97% (31 Mar 2024 09:50) (96% - 99%)    Parameters below as of 31 Mar 2024 09:50  Patient On (Oxygen Delivery Method): room air         Physical exam:    Constitutional:  No acute distress  HEENT: NC/AT, EOMI, PERRLA, conjunctivae clear; ears and nose atraumatic; pharynx benign  Neck: supple; thyroid not palpable  Back: no tenderness  Respiratory: respiratory effort normal; crackles at bases  Cardiovascular: S1S2 regular, no murmurs  Abdomen: soft, not tender, not distended, positive BS  Genitourinary: no suprapubic tenderness  Lymphatic: no LN palpable  Musculoskeletal: no muscle tenderness, no joint swelling or tenderness  Extremities: no pedal edema  Neurological/ Psychiatric: AxOx3, moving all extremities  Skin: no rashes; no palpable lesions    Labs: all available labs reviewed                        13.6   6.36  )-----------( 204      ( 30 Mar 2024 05:18 )             40.2     03-30    140  |  108  |  22  ----------------------------<  143<H>  3.4<L>   |  23  |  0.73    Ca    9.0      30 Mar 2024 05:18    TPro  6.9  /  Alb  3.7  /  TBili  0.7  /  DBili  x   /  AST  29  /  ALT  30  /  AlkPhos  83  03-30     LIVER FUNCTIONS - ( 30 Mar 2024 05:18 )  Alb: 3.7 g/dL / Pro: 6.9 gm/dL / ALK PHOS: 83 U/L / ALT: 30 U/L / AST: 29 U/L / GGT: x           Urinalysis (03-30 @ 09:15)  Urine Appearance: Clear  Protein, Urine: Negative mg/dL  Urine Microscopic-Add On (NC) (03-30 @ 09:15)  White Blood Cell - Urine: 3 /HPF  Red Blood Cell - Urine: 13 /HPF    Culture - Blood (collected 30 Mar 2024 05:18)  Source: .Blood None  Preliminary Report (31 Mar 2024 10:02):    No growth at 24 hours    Culture - Blood (collected 30 Mar 2024 05:18)  Source: .Blood None  Preliminary Report (31 Mar 2024 10:02):    No growth at 24 hours    Radiology: all available radiological tests reviewed    < from: CT Chest w/ IV Cont (03.30.24 @ 07:40) >  1. Mild patchy groundglass opacity at the right lung base which may represent mild pneumonitis.  2. Patulous fluid and food distended esophagus, suggestive of gastroesophageal reflux.  3. Small bilateral nonobstructing renal stones.  < end of copied text >    Advanced directives addressed: full resuscitation

## 2024-04-01 ENCOUNTER — TRANSCRIPTION ENCOUNTER (OUTPATIENT)
Age: 79
End: 2024-04-01

## 2024-04-02 ENCOUNTER — EMERGENCY (EMERGENCY)
Facility: HOSPITAL | Age: 79
LOS: 0 days | Discharge: HOME | End: 2024-04-02
Attending: EMERGENCY MEDICINE
Payer: MEDICARE

## 2024-04-02 VITALS
DIASTOLIC BLOOD PRESSURE: 66 MMHG | TEMPERATURE: 98 F | RESPIRATION RATE: 18 BRPM | OXYGEN SATURATION: 100 % | SYSTOLIC BLOOD PRESSURE: 135 MMHG | HEART RATE: 72 BPM

## 2024-04-02 VITALS
HEART RATE: 78 BPM | HEIGHT: 63 IN | SYSTOLIC BLOOD PRESSURE: 165 MMHG | TEMPERATURE: 98 F | RESPIRATION RATE: 24 BRPM | OXYGEN SATURATION: 100 % | WEIGHT: 145.06 LBS | DIASTOLIC BLOOD PRESSURE: 93 MMHG

## 2024-04-02 DIAGNOSIS — I10 ESSENTIAL (PRIMARY) HYPERTENSION: ICD-10-CM

## 2024-04-02 DIAGNOSIS — R19.7 DIARRHEA, UNSPECIFIED: ICD-10-CM

## 2024-04-02 DIAGNOSIS — R06.02 SHORTNESS OF BREATH: ICD-10-CM

## 2024-04-02 DIAGNOSIS — E78.5 HYPERLIPIDEMIA, UNSPECIFIED: ICD-10-CM

## 2024-04-02 DIAGNOSIS — E03.9 HYPOTHYROIDISM, UNSPECIFIED: ICD-10-CM

## 2024-04-02 DIAGNOSIS — J45.909 UNSPECIFIED ASTHMA, UNCOMPLICATED: ICD-10-CM

## 2024-04-02 DIAGNOSIS — Z88.2 ALLERGY STATUS TO SULFONAMIDES: ICD-10-CM

## 2024-04-02 DIAGNOSIS — U07.1 COVID-19: ICD-10-CM

## 2024-04-02 DIAGNOSIS — I44.0 ATRIOVENTRICULAR BLOCK, FIRST DEGREE: ICD-10-CM

## 2024-04-02 LAB
ALBUMIN SERPL ELPH-MCNC: 4 G/DL — SIGNIFICANT CHANGE UP (ref 3.3–5)
ALP SERPL-CCNC: 80 U/L — SIGNIFICANT CHANGE UP (ref 40–120)
ALT FLD-CCNC: 43 U/L — SIGNIFICANT CHANGE UP (ref 12–78)
ANION GAP SERPL CALC-SCNC: 8 MMOL/L — SIGNIFICANT CHANGE UP (ref 5–17)
APPEARANCE UR: ABNORMAL
AST SERPL-CCNC: 39 U/L — HIGH (ref 15–37)
BACTERIA # UR AUTO: NEGATIVE /HPF — SIGNIFICANT CHANGE UP
BASOPHILS # BLD AUTO: 0.03 K/UL — SIGNIFICANT CHANGE UP (ref 0–0.2)
BASOPHILS NFR BLD AUTO: 0.4 % — SIGNIFICANT CHANGE UP (ref 0–2)
BILIRUB SERPL-MCNC: 0.6 MG/DL — SIGNIFICANT CHANGE UP (ref 0.2–1.2)
BILIRUB UR-MCNC: NEGATIVE — SIGNIFICANT CHANGE UP
BUN SERPL-MCNC: 15 MG/DL — SIGNIFICANT CHANGE UP (ref 7–23)
CALCIUM SERPL-MCNC: 9.6 MG/DL — SIGNIFICANT CHANGE UP (ref 8.5–10.1)
CAST: 0 /LPF — SIGNIFICANT CHANGE UP (ref 0–4)
CHLORIDE SERPL-SCNC: 105 MMOL/L — SIGNIFICANT CHANGE UP (ref 96–108)
CO2 SERPL-SCNC: 25 MMOL/L — SIGNIFICANT CHANGE UP (ref 22–31)
COLOR SPEC: YELLOW — SIGNIFICANT CHANGE UP
CREAT SERPL-MCNC: 0.7 MG/DL — SIGNIFICANT CHANGE UP (ref 0.5–1.3)
DIFF PNL FLD: ABNORMAL
EGFR: 88 ML/MIN/1.73M2 — SIGNIFICANT CHANGE UP
EOSINOPHIL # BLD AUTO: 0.06 K/UL — SIGNIFICANT CHANGE UP (ref 0–0.5)
EOSINOPHIL NFR BLD AUTO: 0.8 % — SIGNIFICANT CHANGE UP (ref 0–6)
GLUCOSE SERPL-MCNC: 115 MG/DL — HIGH (ref 70–99)
GLUCOSE UR QL: NEGATIVE MG/DL — SIGNIFICANT CHANGE UP
HCT VFR BLD CALC: 40 % — SIGNIFICANT CHANGE UP (ref 34.5–45)
HGB BLD-MCNC: 13.8 G/DL — SIGNIFICANT CHANGE UP (ref 11.5–15.5)
IMM GRANULOCYTES NFR BLD AUTO: 0.1 % — SIGNIFICANT CHANGE UP (ref 0–0.9)
KETONES UR-MCNC: 15 MG/DL
LEUKOCYTE ESTERASE UR-ACNC: ABNORMAL
LIDOCAIN IGE QN: 84 U/L — HIGH (ref 13–75)
LYMPHOCYTES # BLD AUTO: 0.89 K/UL — LOW (ref 1–3.3)
LYMPHOCYTES # BLD AUTO: 12 % — LOW (ref 13–44)
MAGNESIUM SERPL-MCNC: 2 MG/DL — SIGNIFICANT CHANGE UP (ref 1.6–2.6)
MCHC RBC-ENTMCNC: 30.5 PG — SIGNIFICANT CHANGE UP (ref 27–34)
MCHC RBC-ENTMCNC: 34.5 GM/DL — SIGNIFICANT CHANGE UP (ref 32–36)
MCV RBC AUTO: 88.5 FL — SIGNIFICANT CHANGE UP (ref 80–100)
MONOCYTES # BLD AUTO: 0.49 K/UL — SIGNIFICANT CHANGE UP (ref 0–0.9)
MONOCYTES NFR BLD AUTO: 6.6 % — SIGNIFICANT CHANGE UP (ref 2–14)
NEUTROPHILS # BLD AUTO: 5.93 K/UL — SIGNIFICANT CHANGE UP (ref 1.8–7.4)
NEUTROPHILS NFR BLD AUTO: 80.1 % — HIGH (ref 43–77)
NITRITE UR-MCNC: NEGATIVE — SIGNIFICANT CHANGE UP
NT-PROBNP SERPL-SCNC: 105 PG/ML — SIGNIFICANT CHANGE UP (ref 0–450)
PH UR: 8 — SIGNIFICANT CHANGE UP (ref 5–8)
PHOSPHATE SERPL-MCNC: 1.9 MG/DL — LOW (ref 2.5–4.5)
PLATELET # BLD AUTO: 233 K/UL — SIGNIFICANT CHANGE UP (ref 150–400)
POTASSIUM SERPL-MCNC: 3.6 MMOL/L — SIGNIFICANT CHANGE UP (ref 3.5–5.3)
POTASSIUM SERPL-SCNC: 3.6 MMOL/L — SIGNIFICANT CHANGE UP (ref 3.5–5.3)
PROT SERPL-MCNC: 7 GM/DL — SIGNIFICANT CHANGE UP (ref 6–8.3)
PROT UR-MCNC: NEGATIVE MG/DL — SIGNIFICANT CHANGE UP
RBC # BLD: 4.52 M/UL — SIGNIFICANT CHANGE UP (ref 3.8–5.2)
RBC # FLD: 13 % — SIGNIFICANT CHANGE UP (ref 10.3–14.5)
RBC CASTS # UR COMP ASSIST: 3 /HPF — SIGNIFICANT CHANGE UP (ref 0–4)
SODIUM SERPL-SCNC: 138 MMOL/L — SIGNIFICANT CHANGE UP (ref 135–145)
SP GR SPEC: 1.01 — SIGNIFICANT CHANGE UP (ref 1–1.03)
SQUAMOUS # UR AUTO: 2 /HPF — SIGNIFICANT CHANGE UP (ref 0–5)
TROPONIN I, HIGH SENSITIVITY RESULT: 4.81 NG/L — SIGNIFICANT CHANGE UP
UROBILINOGEN FLD QL: 0.2 MG/DL — SIGNIFICANT CHANGE UP (ref 0.2–1)
WBC # BLD: 7.41 K/UL — SIGNIFICANT CHANGE UP (ref 3.8–10.5)
WBC # FLD AUTO: 7.41 K/UL — SIGNIFICANT CHANGE UP (ref 3.8–10.5)
WBC UR QL: 4 /HPF — SIGNIFICANT CHANGE UP (ref 0–5)

## 2024-04-02 PROCEDURE — 71275 CT ANGIOGRAPHY CHEST: CPT | Mod: MC

## 2024-04-02 PROCEDURE — 99285 EMERGENCY DEPT VISIT HI MDM: CPT

## 2024-04-02 PROCEDURE — 93010 ELECTROCARDIOGRAM REPORT: CPT

## 2024-04-02 PROCEDURE — 83735 ASSAY OF MAGNESIUM: CPT

## 2024-04-02 PROCEDURE — 83690 ASSAY OF LIPASE: CPT

## 2024-04-02 PROCEDURE — 71275 CT ANGIOGRAPHY CHEST: CPT | Mod: 26,MC

## 2024-04-02 PROCEDURE — 71045 X-RAY EXAM CHEST 1 VIEW: CPT

## 2024-04-02 PROCEDURE — 84100 ASSAY OF PHOSPHORUS: CPT

## 2024-04-02 PROCEDURE — 81001 URINALYSIS AUTO W/SCOPE: CPT

## 2024-04-02 PROCEDURE — 83880 ASSAY OF NATRIURETIC PEPTIDE: CPT

## 2024-04-02 PROCEDURE — 84484 ASSAY OF TROPONIN QUANT: CPT

## 2024-04-02 PROCEDURE — 93005 ELECTROCARDIOGRAM TRACING: CPT

## 2024-04-02 PROCEDURE — 36415 COLL VENOUS BLD VENIPUNCTURE: CPT

## 2024-04-02 PROCEDURE — 80053 COMPREHEN METABOLIC PANEL: CPT

## 2024-04-02 PROCEDURE — 85025 COMPLETE CBC W/AUTO DIFF WBC: CPT

## 2024-04-02 PROCEDURE — 74177 CT ABD & PELVIS W/CONTRAST: CPT | Mod: MC

## 2024-04-02 PROCEDURE — 74177 CT ABD & PELVIS W/CONTRAST: CPT | Mod: 26,MC

## 2024-04-02 PROCEDURE — 87086 URINE CULTURE/COLONY COUNT: CPT

## 2024-04-02 PROCEDURE — 96374 THER/PROPH/DIAG INJ IV PUSH: CPT | Mod: XU

## 2024-04-02 PROCEDURE — 71045 X-RAY EXAM CHEST 1 VIEW: CPT | Mod: 26

## 2024-04-02 PROCEDURE — 99285 EMERGENCY DEPT VISIT HI MDM: CPT | Mod: 25

## 2024-04-02 RX ORDER — DEXAMETHASONE 0.5 MG/5ML
6 ELIXIR ORAL ONCE
Refills: 0 | Status: COMPLETED | OUTPATIENT
Start: 2024-04-02 | End: 2024-04-02

## 2024-04-02 RX ORDER — SODIUM CHLORIDE 9 MG/ML
1000 INJECTION INTRAMUSCULAR; INTRAVENOUS; SUBCUTANEOUS ONCE
Refills: 0 | Status: COMPLETED | OUTPATIENT
Start: 2024-04-02 | End: 2024-04-02

## 2024-04-02 RX ADMIN — Medication 6 MILLIGRAM(S): at 18:39

## 2024-04-02 RX ADMIN — SODIUM CHLORIDE 2000 MILLILITER(S): 9 INJECTION INTRAMUSCULAR; INTRAVENOUS; SUBCUTANEOUS at 18:38

## 2024-04-02 NOTE — ED PROVIDER NOTE - DISPOSITION TYPE
Problem: Discharge Planning  Goal: Discharge to home or other facility with appropriate resources  Outcome: Progressing     Problem: Skin/Tissue Integrity  Goal: Absence of new skin breakdown  Description: 1. Monitor for areas of redness and/or skin breakdown  2. Assess vascular access sites hourly  3. Every 4-6 hours minimum:  Change oxygen saturation probe site  4. Every 4-6 hours:  If on nasal continuous positive airway pressure, respiratory therapy assess nares and determine need for appliance change or resting period. Outcome: Progressing     Problem: Safety - Adult  Goal: Free from fall injury  Outcome: Progressing  Note: Fall precautions in place. Bed locked and in lowest position. Alarm on. Call light within reach. .      Problem: ABCDS Injury Assessment  Goal: Absence of physical injury  Outcome: Progressing     Problem: Chronic Conditions and Co-morbidities  Goal: Patient's chronic conditions and co-morbidity symptoms are monitored and maintained or improved  Outcome: Progressing     Problem: Nutrition Deficit:  Goal: Optimize nutritional status  Outcome: Progressing     Problem: Cardiovascular - Adult  Goal: Maintains optimal cardiac output and hemodynamic stability  Outcome: Progressing  Goal: Absence of cardiac dysrhythmias or at baseline  Outcome: Progressing     Problem: Pain  Goal: Verbalizes/displays adequate comfort level or baseline comfort level  Outcome: Progressing DISCHARGE

## 2024-04-02 NOTE — ED PROVIDER NOTE - NSFOLLOWUPINSTRUCTIONS_ED_ALL_ED_FT
Stop the amoxicillin clavulanic acid because your urine culture is negative. Drink lots of noncaffienated fluids. Follow up with your doctor. Return to ER if increased   shortness of breath, chest pain , very or increased mucous production, or continued severe diarrhea. Follow up with your doctor.    COVID-19  COVID-19 is an infection caused by a virus called SARS-CoV-2. This type of virus is called a coronavirus. People with COVID-19 may:  Have little to no symptoms.  Have mild to moderate symptoms that affect their lungs and breathing.  Get very sick.  What are the causes?  The human body, showing how the coronavirus travels from the air to a person's lungs.  COVID-19 is caused by a virus. This virus may be in the air as droplets or on surfaces. It can spread from an infected person when they cough, sneeze, speak, sing, or breathe. You may become infected if:  You breathe in the infected droplets in the air.  You touch an object that has the virus on it.  What increases the risk?  You are at risk of getting COVID-19 if you have been around someone with the infection. You may be more likely to get very sick if:  You are 65 years old or older.  You have certain medical conditions, such as:  Heart disease.  Diabetes.  Chronic respiratory disease.  Cancer.  Pregnancy.  You are immunocompromised. This means your body cannot fight infections easily.  You have a disability or trouble moving, meaning you're immobile.  What are the signs or symptoms?  People may have different symptoms from COVID-19. The symptoms can also be mild to severe. They often show up in 5–6 days after being infected. But they can take up to 14 days to appear. Common symptoms are:  Cough.  Feeling tired.  New loss of taste or smell.  Fever.  Less common symptoms are:  Sore throat.  Headache.  Body or muscle aches.  Diarrhea.  A skin rash or odd-colored fingers or toes.  Red or irritated eyes.  Sometimes, COVID-19 does not cause symptoms.    How is this diagnosed?  COVID-19 can be diagnosed with tests done in the lab or at home. Fluid from your nose, mouth, or lungs will be used to check for the virus.    How is this treated?  Treatment for COVID-19 depends on how sick you are.  Mild symptoms can be treated at home with rest, fluids, and over-the-counter medicines.  Severe symptoms may be treated in a hospital intensive care unit (ICU).  If you have symptoms and are at risk of getting very sick, you may be given a medicine that fights viruses. This medicine is called an antiviral.    How is this prevented?  To protect yourself from COVID-19:  Know your risk factors.  Get vaccinated.  If your body cannot fight infections easily, talk to your provider about treatment to help prevent COVID-19.  Stay at least 1 meter away from others.  Wear a well-fitted mask when:  You can't stay at a distance from people.  You're in a place with poor air flow.  Try to be in open spaces with good air flow when in public.  Wash your hands often or use an alcohol-based hand .  Cover your nose and mouth when coughing and sneezing.  If you think you have COVID-19 or have been around someone who has it, stay home and be by yourself for 5–10 days.    Where to find more information  Centers for Disease Control and Prevention (CDC): cdc.gov  World Health Organization (WHO): who.int  Get help right away if:  You have trouble breathing or get short of breath.  You have pain or pressure in your chest.  You cannot speak or move any part of your body.  You are confused.  Your symptoms get worse.  These symptoms may be an emergency. Get help right away. Call 911.  Do not wait to see if the symptoms will go away.  Do not drive yourself to the hospital.  This information is not intended to replace advice given to you by your health care provider. Make sure you discuss any questions you have with your health care provider.    Document Revised: 12/26/2023 Document Reviewed: 09/01/2023  Elsevier Patient Education © 2024 [employer]

## 2024-04-02 NOTE — ED ADULT TRIAGE NOTE - CHIEF COMPLAINT QUOTE
Pt presents to er with complaints of nauseau/diarrhea, chills and SOB for the past 6hrs, states she had COVID 4 weeks ago, denies fevers.

## 2024-04-02 NOTE — ED PROVIDER NOTE - OBJECTIVE STATEMENT
78 y/o female with PMHx of HTN, HLD, asthma, hypothyroid presents to the ED c/o SOB, diarrhea. Patient states she was in ED 3/30 with similar symptoms, found to have covid, dc home. States she was feeling better, 2 days ago started having symptoms again. Endorses watery diarrhea. Denies vomiting, travel, sick contacts.

## 2024-04-02 NOTE — ED ADULT NURSE NOTE - PAIN RATING/NUMBER SCALE (0-10): ACTIVITY
No cardiac etiology as pt had event with normal findings on loop recorder.  Autonomic dysfxn may be a concern, secondary to parkinson dz.   0 (no pain/absence of nonverbal indicators of pain)

## 2024-04-02 NOTE — ED ADULT NURSE NOTE - NS ED NURSE LEVEL OF CONSCIOUSNESS MENTAL STATUS
PAST SURGICAL HISTORY:  H/O tubal ligation     H/O: hysterectomy     History of biopsy skin    History of heart surgery cabg      
Awake/Alert/Cooperative

## 2024-04-02 NOTE — ED ADULT NURSE NOTE - OBJECTIVE STATEMENT
pt presents to ED c/o unctorllable shaking and sob. pt satting 100 on room air. pt also endorsing occasional diarrhea x4 days. pt hx HTN, HLD. pt denies cp, ha, dizziness. pt denies fevers and sick contacts; temp 98.8 oral. pt is a&o x4 with no further complaints.

## 2024-04-02 NOTE — ED PROVIDER NOTE - IV ALTEPLASE EXCL REL HIDDEN
Esthela Danielle called to cancel his mother's CT follow up tomorrow in [place] due to transportation. She is currently rescheduled for 8/22 in [place], but Earl Carlos thought if she needed an earlier appointment to be seen Lisa Hall might be able to squeeze her in. Please advise if an earlier appointment would be needed. Thank you.
Lb Coombs called in to schedule earlier apt for the pt. Lb Coombs was offered earliest available apts on 8/1/23 and 8/8/23 but declined as he has conflicts on those days. Consequently, pt was rescheduled for 8/10/23 at [facility]. Lb Coombs stated he did not need the address for [facility].
Left message for the patient's son to call the office back regarding an earlier appointment.
Lvm to pt son that if patient wishes to be scheduled sooner she can be.
Pts son is requesting a call back to get pt scheduled for a sooner appt
show

## 2024-04-02 NOTE — ED PROVIDER NOTE - PATIENT PORTAL LINK FT
You can access the FollowMyHealth Patient Portal offered by [facility] by registering at the following website: [email or web address]. By joining [employer]’s FollowMyHealth portal, you will also be able to view your health information using other applications (apps) compatible with our system.

## 2024-04-02 NOTE — ED PROVIDER NOTE - CLINICAL SUMMARY MEDICAL DECISION MAKING FREE TEXT BOX
78 y/o patient covid + on 3/30, admitted to hospital. Was feeling better, 2 days ago feeling worse. Plan labs, IV hydration 78 y/o patient covid + on 3/30, admitted to hospital. Was feeling better, 2 days ago feeling worse. Plan labs, ct and IV hydration

## 2024-04-02 NOTE — ED ADULT NURSE REASSESSMENT NOTE - NS ED NURSE REASSESS COMMENT FT1
Assumed patient care from Albino HAQ. VS as noted. pt resting in stretcher with no complaints at this time. respirations even and unlabored. pt in NAD
pt  and daughter at bedside; state pt has mild dementia. pt covid + 4 days PTA and currently on amoxicillin for UTI

## 2024-04-02 NOTE — ED PROVIDER NOTE - PROGRESS NOTE DETAILS
received signout from Dr. Hope. Follow uup on cts. Cts neg for acute condition. Results given to pt and family. Questions answered. Advised d/c augmentin as may be contributing to diarrhea and uc is neg from admission. Britt DIAZ

## 2024-04-02 NOTE — ED PROVIDER NOTE - CARE PLAN
Principal Discharge DX:	2019 novel coronavirus disease (COVID-19)  Secondary Diagnosis:	Dyspnea due to COVID-19  Secondary Diagnosis:	Diarrhea   1

## 2024-04-02 NOTE — ED CLERICAL - NS ED CARE COORDINATION INFORMATION
This patient is enrolled in the readmission program and has active care navigation. This patient can be followed up by the care navigation team within 24 hours. To arrange close follow-up or to obtain additional clinical information about this patient, please call the contact number above.   Please call the hospitalist as needed to collaborate on further medical management ([phone])

## 2024-04-03 ENCOUNTER — TRANSCRIPTION ENCOUNTER (OUTPATIENT)
Age: 79
End: 2024-04-03

## 2024-04-04 LAB
CULTURE RESULTS: NO GROWTH — SIGNIFICANT CHANGE UP
CULTURE RESULTS: SIGNIFICANT CHANGE UP
CULTURE RESULTS: SIGNIFICANT CHANGE UP
SPECIMEN SOURCE: SIGNIFICANT CHANGE UP

## 2024-04-05 ENCOUNTER — INPATIENT (INPATIENT)
Facility: HOSPITAL | Age: 79
LOS: 1 days | Discharge: HOME | DRG: 312 | End: 2024-04-07
Attending: STUDENT IN AN ORGANIZED HEALTH CARE EDUCATION/TRAINING PROGRAM | Admitting: FAMILY MEDICINE
Payer: MEDICARE

## 2024-04-05 VITALS
HEART RATE: 86 BPM | DIASTOLIC BLOOD PRESSURE: 82 MMHG | SYSTOLIC BLOOD PRESSURE: 142 MMHG | HEIGHT: 63 IN | WEIGHT: 125 LBS | RESPIRATION RATE: 18 BRPM | OXYGEN SATURATION: 100 %

## 2024-04-05 DIAGNOSIS — C41.9 MALIGNANT NEOPLASM OF BONE AND ARTICULAR CARTILAGE, UNSPECIFIED: ICD-10-CM

## 2024-04-05 DIAGNOSIS — Z79.890 HORMONE REPLACEMENT THERAPY: ICD-10-CM

## 2024-04-05 DIAGNOSIS — I10 ESSENTIAL (PRIMARY) HYPERTENSION: ICD-10-CM

## 2024-04-05 DIAGNOSIS — K21.9 GASTRO-ESOPHAGEAL REFLUX DISEASE WITHOUT ESOPHAGITIS: ICD-10-CM

## 2024-04-05 DIAGNOSIS — J69.0 PNEUMONITIS DUE TO INHALATION OF FOOD AND VOMIT: ICD-10-CM

## 2024-04-05 DIAGNOSIS — J45.909 UNSPECIFIED ASTHMA, UNCOMPLICATED: ICD-10-CM

## 2024-04-05 DIAGNOSIS — Z88.2 ALLERGY STATUS TO SULFONAMIDES: ICD-10-CM

## 2024-04-05 DIAGNOSIS — U07.1 COVID-19: ICD-10-CM

## 2024-04-05 DIAGNOSIS — I44.7 LEFT BUNDLE-BRANCH BLOCK, UNSPECIFIED: ICD-10-CM

## 2024-04-05 DIAGNOSIS — Z87.442 PERSONAL HISTORY OF URINARY CALCULI: ICD-10-CM

## 2024-04-05 DIAGNOSIS — E87.6 HYPOKALEMIA: ICD-10-CM

## 2024-04-05 DIAGNOSIS — J12.82 PNEUMONIA DUE TO CORONAVIRUS DISEASE 2019: ICD-10-CM

## 2024-04-05 DIAGNOSIS — N39.0 URINARY TRACT INFECTION, SITE NOT SPECIFIED: ICD-10-CM

## 2024-04-05 DIAGNOSIS — E78.5 HYPERLIPIDEMIA, UNSPECIFIED: ICD-10-CM

## 2024-04-05 DIAGNOSIS — R68.0 HYPOTHERMIA, NOT ASSOCIATED WITH LOW ENVIRONMENTAL TEMPERATURE: ICD-10-CM

## 2024-04-05 LAB
BASOPHILS # BLD AUTO: 0.06 K/UL — SIGNIFICANT CHANGE UP (ref 0–0.2)
BASOPHILS NFR BLD AUTO: 0.6 % — SIGNIFICANT CHANGE UP (ref 0–2)
EOSINOPHIL # BLD AUTO: 0.13 K/UL — SIGNIFICANT CHANGE UP (ref 0–0.5)
EOSINOPHIL NFR BLD AUTO: 1.3 % — SIGNIFICANT CHANGE UP (ref 0–6)
HCT VFR BLD CALC: 39.8 % — SIGNIFICANT CHANGE UP (ref 34.5–45)
HGB BLD-MCNC: 13.8 G/DL — SIGNIFICANT CHANGE UP (ref 11.5–15.5)
IMM GRANULOCYTES NFR BLD AUTO: 0.6 % — SIGNIFICANT CHANGE UP (ref 0–0.9)
LYMPHOCYTES # BLD AUTO: 1.49 K/UL — SIGNIFICANT CHANGE UP (ref 1–3.3)
LYMPHOCYTES # BLD AUTO: 14.6 % — SIGNIFICANT CHANGE UP (ref 13–44)
MCHC RBC-ENTMCNC: 30.6 PG — SIGNIFICANT CHANGE UP (ref 27–34)
MCHC RBC-ENTMCNC: 34.7 GM/DL — SIGNIFICANT CHANGE UP (ref 32–36)
MCV RBC AUTO: 88.2 FL — SIGNIFICANT CHANGE UP (ref 80–100)
MONOCYTES # BLD AUTO: 0.91 K/UL — HIGH (ref 0–0.9)
MONOCYTES NFR BLD AUTO: 8.9 % — SIGNIFICANT CHANGE UP (ref 2–14)
NEUTROPHILS # BLD AUTO: 7.56 K/UL — HIGH (ref 1.8–7.4)
NEUTROPHILS NFR BLD AUTO: 74 % — SIGNIFICANT CHANGE UP (ref 43–77)
PLATELET # BLD AUTO: 249 K/UL — SIGNIFICANT CHANGE UP (ref 150–400)
RBC # BLD: 4.51 M/UL — SIGNIFICANT CHANGE UP (ref 3.8–5.2)
RBC # FLD: 13.1 % — SIGNIFICANT CHANGE UP (ref 10.3–14.5)
WBC # BLD: 10.21 K/UL — SIGNIFICANT CHANGE UP (ref 3.8–10.5)
WBC # FLD AUTO: 10.21 K/UL — SIGNIFICANT CHANGE UP (ref 3.8–10.5)

## 2024-04-05 PROCEDURE — 71275 CT ANGIOGRAPHY CHEST: CPT | Mod: 26,MC

## 2024-04-05 PROCEDURE — 71045 X-RAY EXAM CHEST 1 VIEW: CPT | Mod: 26

## 2024-04-05 PROCEDURE — 99285 EMERGENCY DEPT VISIT HI MDM: CPT

## 2024-04-05 NOTE — ED ADULT TRIAGE NOTE - CHIEF COMPLAINT QUOTE
Patient from home c/o weakness and being cold.  States she lowered herself to the floor.  Was diagnosed with covid on 3/30.  Unable to get oral temp in triage.

## 2024-04-05 NOTE — ED ADULT NURSE NOTE - CHPI ED NUR SEVERITY2
Left message for pt to return my call. Need to schedule IR procedure.  Please forward call to [phone]. Thanks    
PAIN SCALE 0 OF 10.

## 2024-04-05 NOTE — ED CLERICAL - NS ED CLERK NOTE PRE-ARRIVAL INFORMATION; ADDITIONAL PRE-ARRIVAL INFORMATION
This patient is enrolled in the readmission program and has active care navigation. This patient can be followed up by the care navigation team within 24 hours. To arrange close follow-up or to obtain additional clinical information about this patient, please call the contact number above.   Please call the hospitalist as needed to collaborate on further medical management ([phone])

## 2024-04-05 NOTE — ED ADULT NURSE NOTE - OBJECTIVE STATEMENT
pt. came in from home with c/o generalized body weakness and feeling cold resulting to almost felldown on the floor, denies LOC, Chest pain and SOB, had tested COVID (+) last week, alert and oriented, afebrile with temp. = 98.2 rectally, safety maintained and applied

## 2024-04-05 NOTE — ED ADULT NURSE NOTE - NSFALLRISKINTERV_ED_ALL_ED

## 2024-04-06 DIAGNOSIS — R55 SYNCOPE AND COLLAPSE: ICD-10-CM

## 2024-04-06 DIAGNOSIS — E78.5 HYPERLIPIDEMIA, UNSPECIFIED: ICD-10-CM

## 2024-04-06 DIAGNOSIS — I10 ESSENTIAL (PRIMARY) HYPERTENSION: ICD-10-CM

## 2024-04-06 LAB
ALBUMIN SERPL ELPH-MCNC: 3.9 G/DL — SIGNIFICANT CHANGE UP (ref 3.3–5)
ALP SERPL-CCNC: 82 U/L — SIGNIFICANT CHANGE UP (ref 40–120)
ALT FLD-CCNC: 48 U/L — SIGNIFICANT CHANGE UP (ref 12–78)
ANION GAP SERPL CALC-SCNC: 10 MMOL/L — SIGNIFICANT CHANGE UP (ref 5–17)
ANION GAP SERPL CALC-SCNC: 4 MMOL/L — LOW (ref 5–17)
APPEARANCE UR: CLEAR — SIGNIFICANT CHANGE UP
APTT BLD: 23.9 SEC — LOW (ref 24.5–35.6)
AST SERPL-CCNC: 37 U/L — SIGNIFICANT CHANGE UP (ref 15–37)
BACTERIA # UR AUTO: NEGATIVE /HPF — SIGNIFICANT CHANGE UP
BILIRUB SERPL-MCNC: 0.5 MG/DL — SIGNIFICANT CHANGE UP (ref 0.2–1.2)
BILIRUB UR-MCNC: NEGATIVE — SIGNIFICANT CHANGE UP
BUN SERPL-MCNC: 14 MG/DL — SIGNIFICANT CHANGE UP (ref 7–23)
BUN SERPL-MCNC: 19 MG/DL — SIGNIFICANT CHANGE UP (ref 7–23)
CALCIUM SERPL-MCNC: 8.9 MG/DL — SIGNIFICANT CHANGE UP (ref 8.5–10.1)
CALCIUM SERPL-MCNC: 9.3 MG/DL — SIGNIFICANT CHANGE UP (ref 8.5–10.1)
CAST: 0 /LPF — SIGNIFICANT CHANGE UP (ref 0–4)
CHLORIDE SERPL-SCNC: 104 MMOL/L — SIGNIFICANT CHANGE UP (ref 96–108)
CHLORIDE SERPL-SCNC: 111 MMOL/L — HIGH (ref 96–108)
CO2 SERPL-SCNC: 25 MMOL/L — SIGNIFICANT CHANGE UP (ref 22–31)
CO2 SERPL-SCNC: 27 MMOL/L — SIGNIFICANT CHANGE UP (ref 22–31)
COLOR SPEC: YELLOW — SIGNIFICANT CHANGE UP
CREAT SERPL-MCNC: 0.68 MG/DL — SIGNIFICANT CHANGE UP (ref 0.5–1.3)
CREAT SERPL-MCNC: 0.89 MG/DL — SIGNIFICANT CHANGE UP (ref 0.5–1.3)
DIFF PNL FLD: NEGATIVE — SIGNIFICANT CHANGE UP
EGFR: 66 ML/MIN/1.73M2 — SIGNIFICANT CHANGE UP
EGFR: 89 ML/MIN/1.73M2 — SIGNIFICANT CHANGE UP
GLUCOSE SERPL-MCNC: 102 MG/DL — HIGH (ref 70–99)
GLUCOSE SERPL-MCNC: 71 MG/DL — SIGNIFICANT CHANGE UP (ref 70–99)
GLUCOSE UR QL: NEGATIVE MG/DL — SIGNIFICANT CHANGE UP
INR BLD: 0.94 RATIO — SIGNIFICANT CHANGE UP (ref 0.85–1.18)
KETONES UR-MCNC: ABNORMAL MG/DL
LACTATE SERPL-SCNC: 1.6 MMOL/L — SIGNIFICANT CHANGE UP (ref 0.7–2)
LEUKOCYTE ESTERASE UR-ACNC: ABNORMAL
NITRITE UR-MCNC: NEGATIVE — SIGNIFICANT CHANGE UP
NT-PROBNP SERPL-SCNC: 89 PG/ML — SIGNIFICANT CHANGE UP (ref 0–450)
PH UR: 7.5 — SIGNIFICANT CHANGE UP (ref 5–8)
POTASSIUM SERPL-MCNC: 2.5 MMOL/L — CRITICAL LOW (ref 3.5–5.3)
POTASSIUM SERPL-MCNC: 3.9 MMOL/L — SIGNIFICANT CHANGE UP (ref 3.5–5.3)
POTASSIUM SERPL-SCNC: 2.5 MMOL/L — CRITICAL LOW (ref 3.5–5.3)
POTASSIUM SERPL-SCNC: 3.9 MMOL/L — SIGNIFICANT CHANGE UP (ref 3.5–5.3)
PROT SERPL-MCNC: 7.1 GM/DL — SIGNIFICANT CHANGE UP (ref 6–8.3)
PROT UR-MCNC: NEGATIVE MG/DL — SIGNIFICANT CHANGE UP
PROTHROM AB SERPL-ACNC: 10.6 SEC — SIGNIFICANT CHANGE UP (ref 9.5–13)
RBC CASTS # UR COMP ASSIST: 1 /HPF — SIGNIFICANT CHANGE UP (ref 0–4)
SODIUM SERPL-SCNC: 139 MMOL/L — SIGNIFICANT CHANGE UP (ref 135–145)
SODIUM SERPL-SCNC: 142 MMOL/L — SIGNIFICANT CHANGE UP (ref 135–145)
SP GR SPEC: 1.02 — SIGNIFICANT CHANGE UP (ref 1–1.03)
SQUAMOUS # UR AUTO: 1 /HPF — SIGNIFICANT CHANGE UP (ref 0–5)
TROPONIN I, HIGH SENSITIVITY RESULT: 3.93 NG/L — SIGNIFICANT CHANGE UP
UROBILINOGEN FLD QL: 0.2 MG/DL — SIGNIFICANT CHANGE UP (ref 0.2–1)
WBC UR QL: 4 /HPF — SIGNIFICANT CHANGE UP (ref 0–5)

## 2024-04-06 PROCEDURE — 99222 1ST HOSP IP/OBS MODERATE 55: CPT

## 2024-04-06 PROCEDURE — 80048 BASIC METABOLIC PNL TOTAL CA: CPT

## 2024-04-06 PROCEDURE — 99223 1ST HOSP IP/OBS HIGH 75: CPT

## 2024-04-06 PROCEDURE — 93306 TTE W/DOPPLER COMPLETE: CPT | Mod: 26

## 2024-04-06 PROCEDURE — 36415 COLL VENOUS BLD VENIPUNCTURE: CPT

## 2024-04-06 PROCEDURE — 93010 ELECTROCARDIOGRAM REPORT: CPT

## 2024-04-06 PROCEDURE — 93306 TTE W/DOPPLER COMPLETE: CPT

## 2024-04-06 PROCEDURE — 85027 COMPLETE CBC AUTOMATED: CPT

## 2024-04-06 RX ORDER — LEVOTHYROXINE SODIUM 125 MCG
88 TABLET ORAL DAILY
Refills: 0 | Status: DISCONTINUED | OUTPATIENT
Start: 2024-04-06 | End: 2024-04-07

## 2024-04-06 RX ORDER — DONEPEZIL HYDROCHLORIDE 10 MG/1
10 TABLET, FILM COATED ORAL AT BEDTIME
Refills: 0 | Status: DISCONTINUED | OUTPATIENT
Start: 2024-04-06 | End: 2024-04-07

## 2024-04-06 RX ORDER — ONDANSETRON 8 MG/1
4 TABLET, FILM COATED ORAL EVERY 8 HOURS
Refills: 0 | Status: DISCONTINUED | OUTPATIENT
Start: 2024-04-06 | End: 2024-04-07

## 2024-04-06 RX ORDER — ATORVASTATIN CALCIUM 80 MG/1
20 TABLET, FILM COATED ORAL AT BEDTIME
Refills: 0 | Status: DISCONTINUED | OUTPATIENT
Start: 2024-04-06 | End: 2024-04-07

## 2024-04-06 RX ORDER — POTASSIUM CHLORIDE 20 MEQ
10 PACKET (EA) ORAL
Refills: 0 | Status: COMPLETED | OUTPATIENT
Start: 2024-04-06 | End: 2024-04-06

## 2024-04-06 RX ORDER — HEPARIN SODIUM 5000 [USP'U]/ML
5000 INJECTION INTRAVENOUS; SUBCUTANEOUS EVERY 8 HOURS
Refills: 0 | Status: DISCONTINUED | OUTPATIENT
Start: 2024-04-06 | End: 2024-04-07

## 2024-04-06 RX ORDER — PANTOPRAZOLE SODIUM 20 MG/1
40 TABLET, DELAYED RELEASE ORAL
Refills: 0 | Status: DISCONTINUED | OUTPATIENT
Start: 2024-04-06 | End: 2024-04-07

## 2024-04-06 RX ORDER — AMLODIPINE BESYLATE 2.5 MG/1
5 TABLET ORAL DAILY
Refills: 0 | Status: DISCONTINUED | OUTPATIENT
Start: 2024-04-06 | End: 2024-04-07

## 2024-04-06 RX ORDER — LANOLIN ALCOHOL/MO/W.PET/CERES
3 CREAM (GRAM) TOPICAL AT BEDTIME
Refills: 0 | Status: DISCONTINUED | OUTPATIENT
Start: 2024-04-06 | End: 2024-04-07

## 2024-04-06 RX ORDER — ACETAMINOPHEN 500 MG
650 TABLET ORAL EVERY 6 HOURS
Refills: 0 | Status: DISCONTINUED | OUTPATIENT
Start: 2024-04-06 | End: 2024-04-07

## 2024-04-06 RX ADMIN — HEPARIN SODIUM 5000 UNIT(S): 5000 INJECTION INTRAVENOUS; SUBCUTANEOUS at 13:48

## 2024-04-06 RX ADMIN — Medication 100 MILLIEQUIVALENT(S): at 03:40

## 2024-04-06 RX ADMIN — Medication 100 MILLIEQUIVALENT(S): at 02:36

## 2024-04-06 RX ADMIN — AMLODIPINE BESYLATE 5 MILLIGRAM(S): 2.5 TABLET ORAL at 11:44

## 2024-04-06 RX ADMIN — Medication 100 MILLIEQUIVALENT(S): at 04:45

## 2024-04-06 RX ADMIN — ATORVASTATIN CALCIUM 20 MILLIGRAM(S): 80 TABLET, FILM COATED ORAL at 21:39

## 2024-04-06 RX ADMIN — DONEPEZIL HYDROCHLORIDE 10 MILLIGRAM(S): 10 TABLET, FILM COATED ORAL at 21:39

## 2024-04-06 RX ADMIN — HEPARIN SODIUM 5000 UNIT(S): 5000 INJECTION INTRAVENOUS; SUBCUTANEOUS at 21:38

## 2024-04-06 NOTE — CONSULT NOTE ADULT - NS ATTEND AMEND GEN_ALL_CORE FT
Agree with the above. low suspicion for cardiac etiology of syncope. will f/u echo.   pt should follow up with Dr. Franco ([facility]) as outpatient for OT

## 2024-04-06 NOTE — H&P ADULT - NSHPLABSRESULTS_GEN_ALL_CORE
13.8   10.21 )-----------( 249      ( 05 Apr 2024 23:44 )             39.8       04-05    139  |  104  |  19  ----------------------------<  71  2.5<LL>   |  25  |  0.89    Ca    9.3      05 Apr 2024 23:44    TPro  7.1  /  Alb  3.9  /  TBili  0.5  /  DBili  x   /  AST  37  /  ALT  48  /  AlkPhos  82  04-05 13.8   10.21 )-----------( 249      ( 05 Apr 2024 23:44 )             39.8       04-05    139  |  104  |  19  ----------------------------<  71  2.5<LL>   |  25  |  0.89    Ca    9.3      05 Apr 2024 23:44    TPro  7.1  /  Alb  3.9  /  TBili  0.5  /  DBili  x   /  AST  37  /  ALT  48  /  AlkPhos  82  04-05    Troponin 4,     CTA chest 1.  No evidence of pulmonary embolus.  2.  No acute cardiac pulmonary disease.  3.  Partially solid nodules may represent evidence of ongoing   infectious/inflammatory process. Same modality follow-up in 30 days is   recommended for reassessment.

## 2024-04-06 NOTE — H&P ADULT - ASSESSMENT
78 yo female with Hx of HTN, Asthma , Lung nodules monitored at [facility] , Hospitalized on 3/30/24 with COVID 19 , pneumonia   discharged on 3/21/24 on Augmentin, returened to ED on 4/2/23  c/o SOB d/c home improved, returned to the ED yesterday c/o 2 syncopal episodes, she fell light headed and fell to the  ground, denies head injury. The patient feels that her symptoms are caused by long COVID but today is feeling much better  assessment Dx:                       1. Syncope                        2. long COVID                       3. HTN                        4. Asthma stable    Plan:    admit to Telemetry                medications: continue current meds as per med rec.               VTEP:  heparin                Labs: cbc,cmp troponins               Radiology:               Cardiac diagnostics: EKG                Consults: Cardiology                 Advance Directive: full code,                                  78 yo female with Hx of HTN, Asthma , Lung nodules monitored at [facility] , Hospitalized on 3/30/24 with COVID 19 , pneumonia   discharged on 3/21/24 on Augmentin, returened to ED on 4/2/23  c/o SOB d/c home improved, returned to the ED yesterday c/o 2 syncopal episodes, she fell light headed and fell to the  ground, denies head injury. The patient feels that her symptoms are caused by long COVID but today is feeling much better  assessment Dx:                       1. Syncope                        2. Hypokalemia                        3. long COVID                       4. Asthma stable    Plan:    admit to Telemetry                 medications: supplement K, repeat BMP,  continue current meds as per med rec.               VTEP:  heparin                Labs: cbc,cmp troponins               Radiology:CTA chest neg.                Cardiac diagnostics: EKG                Consults: Cardiology                 Advance Directive: full code,

## 2024-04-06 NOTE — ED PROVIDER NOTE - OBJECTIVE STATEMENT
78 y/o female with PMHx of HTN, HLD, asthma, hypothyroid presents to the ED c/o 2 episodes of near syncope. Patient states she felt lightheaded and slid to the ground, denies head trauma. Patient was seen earlier this week for COVID and admitted then reevaluated for persistent diarrhea.

## 2024-04-06 NOTE — H&P ADULT - HISTORY OF PRESENT ILLNESS
HPI: The patient is a 80 yo female with Hx of HTN, Asthma , Lung nodules monitored at [facility] , Hospitalized on 3/30/24 with COVID 19 , pneumonia   discharged on 3/21/24 on Augmentin, returened to ED on 23  c/o SOB d/c home improved, returned to the ED yesterday c/o 2 syncopal episodes, she fell light headed and fell to the  ground, denies head injury.    PMHx: HTN,  HLD,  Asthma, Hypothyroidism, Osteogenic Sarcoma left mandible, lung nodules monitored at [facility]    PSHx: denies major surgeries     Family Hx: Father  from MI, mother  at old age.    Social Hx.: not smoking, no alcohol use

## 2024-04-06 NOTE — CONSULT NOTE ADULT - PROBLEM SELECTOR RECOMMENDATION 9
Hx or recent COVID PNA.  EKG SR, no JAZMINE, trop and BNP neg  -  check orthostatic BPs  -  check Echo to evaluate LVF, valves  -  cont outpt followup with primary cardiologist, Dr. Franco Hx or recent COVID PNA.  EKG SR, no JAZMINE, trop and BNP neg  -  check orthostatic BPs  -  check Echo to evaluate LVF, valves  -  utpt followup with primary cardiologist, Dr. Franco

## 2024-04-06 NOTE — ED PROVIDER NOTE - CLINICAL SUMMARY MEDICAL DECISION MAKING FREE TEXT BOX
Patient with near syncope in the setting of recent COVID, CTA chest rule out PE, will check labs, admit

## 2024-04-06 NOTE — H&P ADULT - NSHPPHYSICALEXAM_GEN_ALL_CORE
Physical Exam: Vital Signs Last 24 Hrs  T(C): 36.4 (06 Apr 2024 08:41), Max: 36.8 (05 Apr 2024 23:00)  T(F): 97.5 (06 Apr 2024 08:41), Max: 98.2 (05 Apr 2024 23:00)  HR: 73 (06 Apr 2024 08:41) (67 - 94)  BP: 121/80 (06 Apr 2024 08:41) (121/80 - 147/80)  BP(mean): 100 (06 Apr 2024 05:09) (98 - 101)  RR: 18 (06 Apr 2024 08:41) (15 - 19)  SpO2: 97% (06 Apr 2024 08:41) (96% - 100%)    Parameters below as of 06 Apr 2024 08:41  Patient On (Oxygen Delivery Method): room air            HEENT: PRRL EOMI    MOUTH/TEETH/GUMS: Clear    NECK: no JVD    LUNGS: Clear    HEART: S1,S2 RR    ABDOMEN: soft nontender    EXTREMITIES:  no pedal edema    MUSCULOSKELETAL: no joint swelling     NEURO: no tremor, no focal signs.    SKIN: no rash    : CVA negative,

## 2024-04-06 NOTE — H&P ADULT - NSHPREVIEWOFSYSTEMS_GEN_ALL_CORE
ROS: as in HPI   Eyes: no changes in vision  ENT/Mouth: no changes    Cardiovascular: no chest pain    Respiratory: no SOB    Gastrointestinal: no diarrhea, no nausea, no vomiting    Genitourinary: no dysuria    Breast: no pain    Musculoskeletal: no pain    Integumentary: no itching    Neurological: No Headache, no tremor,    Endocrine: no excessive thirst,     Allergic/Immunologic: no itching

## 2024-04-06 NOTE — CONSULT NOTE ADULT - SUBJECTIVE AND OBJECTIVE BOX
REQUESTING PHYSICIAN:    REASON FOR CONSULT:    CHIEF COMPLAINT:    HPI:  HPI: The patient is a 78 yo female with Hx of HTN, Asthma , Lung nodules monitored at [facility] , Hospitalized on 3/30/24 with COVID 19 , pneumonia   discharged on 3/21/24 on Augmentin, returened to ED on 23  c/o SOB d/c home improved, returned to the ED yesterday c/o 2 syncopal episodes, she fell light headed and fell to the  ground, denies head injury.    PMHx: HTN,  HLD,  Asthma, Hypothyroidism, Osteogenic Sarcoma left mandible, lung nodules monitored at [facility]    PSHx: denies major surgeries     Family Hx: Father  from MI, mother  at old age.    Social Hx.: not smoking, no alcohol use             (2024 09:03)        PAST MEDICAL & SURGICAL HISTORY:  HTN (hypertension)  HLD (hyperlipidemia)  Hypothyroid  Osteogenic sarcoma  Asthma    ALLERGIES:  sulfa drugs (Unknown)  Intolerances    SOCIAL HISTORY:      FAMILY  HISTORY:        MEDICATIONS:  Home Medications:  amLODIPine 5 mg oral tablet: 1 tab(s) orally once a day (in the morning) (2024 09:55)  donepezil 10 mg oral tablet: 1 tab(s) orally once a day (in the evening) (2024 09:55)  levothyroxine 88 mcg (0.088 mg) oral tablet: 1 tab(s) orally once a day (in the morning) (2024 09:55)  Refresh ophthalmic solution: 1 drop(s) in each eye 2 times a day as needed for (2024 09:55)  rivastigmine 1.5 mg oral capsule: 1 cap(s) orally 2 times a day (2024 09:55)  rosuvastatin 5 mg oral tablet: 1 tab(s) orally once a day (at bedtime) (2024 09:55)    MEDICATIONS  (STANDING):  amLODIPine   Tablet 5 milliGRAM(s) Oral daily  atorvastatin 20 milliGRAM(s) Oral at bedtime  donepezil 10 milliGRAM(s) Oral at bedtime  heparin   Injectable 5000 Unit(s) SubCutaneous every 8 hours  levothyroxine 88 MICROGram(s) Oral daily  pantoprazole    Tablet 40 milliGRAM(s) Oral before breakfast    MEDICATIONS  (PRN):  acetaminophen     Tablet .. 650 milliGRAM(s) Oral every 6 hours PRN Temp greater or equal to 38C (100.4F), Mild Pain (1 - 3)  aluminum hydroxide/magnesium hydroxide/simethicone Suspension 30 milliLiter(s) Oral every 4 hours PRN Dyspepsia  melatonin 3 milliGRAM(s) Oral at bedtime PRN Insomnia  ondansetron Injectable 4 milliGRAM(s) IV Push every 8 hours PRN Nausea and/or Vomiting        REVIEW OF SYSTEMS:  CONSTITUTIONAL: No weakness, fevers or chills  EYES/ENT: No visual changes;  No vertigo or throat pain   NECK: No pain or stiffness  RESPIRATORY: No cough, wheezing, hemoptysis; No shortness of breath  CARDIOVASCULAR: No chest pain or palpitations  GASTROINTESTINAL: No abdominal or epigastric pain. No nausea, vomiting, or hematemesis;   No diarrhea or constipation. No melena or hematochezia.  GENITOURINARY: No dysuria, frequency or hematuria  NEUROLOGICAL: No numbness or weakness  SKIN: No itching, burning, rashes, or lesions   All other review of systems is negative unless indicated above      Vital Signs Last 24 Hrs  Vital Signs Last 24 Hrs  T(C): 36.4 (2024 08:41), Max: 36.8 (2024 23:00)  T(F): 97.5 (2024 08:41), Max: 98.2 (2024 23:00)  HR: 73 (2024 08:41) (67 - 94)  BP: 121/80 (2024 08:41) (121/80 - 147/80)  BP(mean): 100 (2024 05:09) (98 - 101)  RR: 18 (2024 08:41) (15 - 19)  SpO2: 97% (2024 08:41) (96% - 100%)    Parameters below as of 2024 08:41  Patient On (Oxygen Delivery Method): room air    I&O's Summary    Daily Height in cm: 160.02 (2024 22:24)        PHYSICAL EXAM:  Constitutional: NAD, awake and alert, well-developed  HEENT: PERR, EOMI,  No oral cyananosis.  Neck:  supple,  No JVD  Respiratory: Breath sounds are clear bilaterally, No wheezing, rales or rhonchi  Cardiovascular: S1 and S2, regular rate and rhythm, no Murmurs, gallops or rubs  Gastrointestinal: Bowel Sounds present, soft, nontender.   Extremities: No peripheral edema. No clubbing or cyanosis.  Vascular: 2+ peripheral pulses  Neurological: A/O x 3, no focal deficits  Musculoskeletal: no calf tenderness.  Skin: No rashes.      LABS:                         13.8   10.21 )-----------( 249      ( 2024 23:44 )             39.8     2024 10:58    142    |  111    |  14     ----------------------------<  102    3.9     |  27     |  0.68   2024 23:44    139    |  104    |  19     ----------------------------<  71     2.5     |  25     |  0.89     Ca    8.9        2024 10:58  Ca    9.3        2024 23:44    TPro  7.1    /  Alb  3.9    /  TBili  0.5    /  DBili  x      /  AST  37     /  ALT  48     /  AlkPhos  82     2024 23:44  PT/INR - ( 2024 23:44 )   PT: 10.6 sec;   INR: 0.94 ratio    PTT - ( 2024 23:44 )  PTT:23.9 sec      THYROID STUDIES:        ===============================  ===============================  CARDIAC BIOMARKERS:  Pro-Brain Natriuretic Peptide: 89 pg/mL (24 @ 23:44)   -BNP VALUES:  12-06 @ 16:50  Pro Bnp 68    TROPONIN VALUES:  Troponin I, High Sensitivity Result: 3.93 ng/L (24 @ 23:44)  Troponin I, High Sensitivity Result: 4.81 ng/L (24 @ 18:08)  Troponin I, High Sensitivity Result: 4.46 ng/L (24 @ 08:16)  Troponin I, High Sensitivity Result: 4.94 ng/L (24 @ 05:18)  Troponin I, High Sensitivity Result: 5.54 ng/L (22 @ 16:50)    CULTURES:  Culture - Urine (24 @ 09:15)   <10,000 CFU/mL Normal Urogenital Tia  Culture - Blood (24 @ 05:18)   No growth at 5 days  Culture - Blood (24 @ 05:18)   No growth at 5 days  Respiratory Viral Panel with COVID-19 by MUKUND (22 @ 16:50)   Rapid RVP Result: NotDetec      EKG:  < from: 12 Lead ECG (24 @ 19:06) >  Diagnosis Line Sinus rhythm [id number] degree A-V block  Possible Left atrial enlargement  Left axis deviation  Minimal voltage criteria for LVH, may be normal variant ( [place] product )  Anteroseptal infarct (cited on or before 30-MAR-2024)  Abnormal ECG  When compared with ECG of 30-MAR-2024 08:58,  No significant change was found  Confirmed by Darshana Gaona ([id number]) on 4/3/2024 10:46:25 AM    RADIOLOGY:  < from: CT Angio Chest PE Protocol w/ IV Cont (24 @ 23:47) >  IMPRESSION:  1.  No evidence of pulmonary embolus.  2.  No acute cardiac pulmonary disease.  3.  Partially solid nodules may represent evidence of ongoing   infectious/inflammatory process. Same modality follow-up in 30 days is   recommended for reassessment.    < from: Xray Chest 1 View- PORTABLE-Urgent (24 @ 23:52) >  IMPRESSION: No evidence of active chest disease.   No significant change   from prior study dated 2024.        ===============================  ECHO:    ===============================  CARDIAC CATH:    ===============================  STRESS TESTING:                                    =============================== REQUESTING PHYSICIAN:  Dr. 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  States last stress test and echo were done 2 years ago were "normal."  Denies hx of CVA, TIA, ASHD, VHD or arrhythmias.    Returned to ER 24 with c/o syncopal episode, states she woke up, walked to bathroom, urinated and while walking back to bed, she felt sudden lightheadedness, chills and leaned against counter then slid to ground.  She did not lose consciousness.  Denies any head or body trauma, chest pain, SOB, palpitations.  States since dc home she has been increasing po fluids and voiding well.    Cardiologist:  Dr. Franco    PAST MEDICAL & SURGICAL HISTORY:  HTN (hypertension)  HLD (hyperlipidemia)  Hypothyroid  Osteogenic sarcoma  Asthma    ALLERGIES:  sulfa drugs (Unknown)  Intolerances    SOCIAL HISTORY:  former smoker  no ETOH    FAMILY  HISTORY:  Father ( of MI)      MEDICATIONS:  Home Medications:  amLODIPine 5 mg oral tablet: 1 tab(s) orally once a day (in the morning) (2024 09:55)  donepezil 10 mg oral tablet: 1 tab(s) orally once a day (in the evening) (2024 09:55)  levothyroxine 88 mcg (0.088 mg) oral tablet: 1 tab(s) orally once a day (in the morning) (2024 09:55)  Refresh ophthalmic solution: 1 drop(s) in each eye 2 times a day as needed for (2024 09:55)  rivastigmine 1.5 mg oral capsule: 1 cap(s) orally 2 times a day (2024 09:55)  rosuvastatin 5 mg oral tablet: 1 tab(s) orally once a day (at bedtime) (2024 09:55)    MEDICATIONS  (STANDING):  amLODIPine   Tablet 5 milliGRAM(s) Oral daily  atorvastatin 20 milliGRAM(s) Oral at bedtime  donepezil 10 milliGRAM(s) Oral at bedtime  heparin   Injectable 5000 Unit(s) SubCutaneous every 8 hours  levothyroxine 88 MICROGram(s) Oral daily  pantoprazole    Tablet 40 milliGRAM(s) Oral before breakfast    MEDICATIONS  (PRN):  acetaminophen     Tablet .. 650 milliGRAM(s) Oral every 6 hours PRN Temp greater or equal to 38C (100.4F), Mild Pain (1 - 3)  aluminum hydroxide/magnesium hydroxide/simethicone Suspension 30 milliLiter(s) Oral every 4 hours PRN Dyspepsia  melatonin 3 milliGRAM(s) Oral at bedtime PRN Insomnia  ondansetron Injectable 4 milliGRAM(s) IV Push every 8 hours PRN Nausea and/or Vomiting      REVIEW OF SYSTEMS:  CONSTITUTIONAL: No weakness, fevers, +chills  EYES/ENT: No visual changes;  No vertigo or throat pain   NECK: No pain or stiffness  RESPIRATORY: No cough, wheezing, hemoptysis; No shortness of breath  CARDIOVASCULAR: No chest pain or palpitations  GASTROINTESTINAL: No abdominal or epigastric pain. No nausea, vomiting, or hematemesis;   No diarrhea or constipation. No melena or hematochezia.  GENITOURINARY: No dysuria, frequency or hematuria  NEUROLOGICAL: No numbness or weakness; +lightheaded  SKIN: No itching, burning, rashes, or lesions   All other review of systems is negative unless indicated above      Vital Signs Last 24 Hrs  T(C): 36.4 (2024 08:41), Max: 36.8 (2024 23:00)  T(F): 97.5 (2024 08:41), Max: 98.2 (2024 23:00)  HR: 73 (2024 08:41) (67 - 94)  BP: 121/80 (2024 08:41) (121/80 - 147/80)  BP(mean): 100 (2024 05:09) (98 - 101)  RR: 18 (2024 08:41) (15 - 19)  SpO2: 97% (2024 08:41) (96% - 100%)    Parameters below as of 2024 08:41  Patient On (Oxygen Delivery Method): room air    I&O's Summary    Daily Height in cm: 160.02 (2024 22:24)        PHYSICAL EXAM:  Constitutional: NAD, awake and alert, well-developed  HEENT: PERR, EOMI,  No oral cyananosis.  Neck:  supple,  No JVD  Respiratory: decreased at bases (L>R), No wheezing, rales or rhonchi  Cardiovascular: S1 and S2, regular rate and rhythm, no Murmurs, gallops or rubs  Gastrointestinal: Bowel Sounds present, soft, nontender.   Extremities: No peripheral edema. No clubbing or cyanosis.  Vascular: 2+ peripheral pulses  Neurological: A/O x 3, no focal deficits  Musculoskeletal: no calf tenderness.  Skin: No rashes.      LABS:                         13.8   10.21 )-----------( 249      ( 2024 23:44 )             39.8     2024 10:58    142    |  111    |  14     ----------------------------<  102    3.9     |  27     |  0.68   2024 23:44    139    |  104    |  19     ----------------------------<  71     2.5     |  25     |  0.89     Ca    8.9        2024 10:58  Ca    9.3        2024 23:44    TPro  7.1    /  Alb  3.9    /  TBili  0.5    /  DBili  x      /  AST  37     /  ALT  48     /  AlkPhos  82     2024 23:44  PT/INR - ( 2024 23:44 )   PT: 10.6 sec;   INR: 0.94 ratio    PTT - ( 2024 23:44 )  PTT:23.9 sec    CARDIAC BIOMARKERS:  Pro-Brain Natriuretic Peptide: 89 pg/mL (24 @ 23:44)   -BNP VALUES:  12-06 @ 16:50  Pro Bnp 68    TROPONIN VALUES:  Troponin I, High Sensitivity Result: 3.93 ng/L (24 @ 23:44)  Troponin I, High Sensitivity Result: 4.81 ng/L (24 @ 18:08)  Troponin I, High Sensitivity Result: 4.46 ng/L (24 @ 08:16)  Troponin I, High Sensitivity Result: 4.94 ng/L (24 @ 05:18)  Troponin I, High Sensitivity Result: 5.54 ng/L (22 @ 16:50)    CULTURES:  Culture - Urine (24 @ 09:15)   <10,000 CFU/mL Normal Urogenital Tia  Culture - Blood (24 @ 05:18)   No growth at 5 days  Culture - Blood (24 @ 05:18)   No growth at 5 days  Respiratory Viral Panel with COVID-19 by MUKUND (22 @ 16:50)   Rapid RVP Result: NotDetec      EKG:  < from: 12 Lead ECG (24 @ 19:06) >  Diagnosis Line Sinus rhythm [id number] degree A-V block  Possible Left atrial enlargement  Left axis deviation  Minimal voltage criteria for LVH, may be normal variant ( [place] product )  Anteroseptal infarct (cited on or before 30-MAR-2024)  Abnormal ECG  When compared with ECG of 30-MAR-2024 08:58,  No significant change was found  Confirmed by Darshana Gaona ([id number]) on 4/3/2024 10:46:25 AM    RADIOLOGY:  < from: CT Angio Chest PE Protocol w/ IV Cont (24 @ 23:47) >  IMPRESSION:  1.  No evidence of pulmonary embolus.  2.  No acute cardiac pulmonary disease.  3.  Partially solid nodules may represent evidence of ongoing   infectious/inflammatory process. Same modality follow-up in 30 days is   recommended for reassessment.    < from: Xray Chest 1 View- PORTABLE-Urgent (24 @ 23:52) >  IMPRESSION: No evidence of active chest disease.   No significant change   from prior study dated 2024.

## 2024-04-07 ENCOUNTER — TRANSCRIPTION ENCOUNTER (OUTPATIENT)
Age: 79
End: 2024-04-07

## 2024-04-07 VITALS
RESPIRATION RATE: 18 BRPM | OXYGEN SATURATION: 97 % | HEART RATE: 65 BPM | SYSTOLIC BLOOD PRESSURE: 138 MMHG | TEMPERATURE: 98 F | DIASTOLIC BLOOD PRESSURE: 81 MMHG

## 2024-04-07 LAB
ANION GAP SERPL CALC-SCNC: 7 MMOL/L — SIGNIFICANT CHANGE UP (ref 5–17)
BUN SERPL-MCNC: 15 MG/DL — SIGNIFICANT CHANGE UP (ref 7–23)
CALCIUM SERPL-MCNC: 9.3 MG/DL — SIGNIFICANT CHANGE UP (ref 8.5–10.1)
CHLORIDE SERPL-SCNC: 111 MMOL/L — HIGH (ref 96–108)
CO2 SERPL-SCNC: 26 MMOL/L — SIGNIFICANT CHANGE UP (ref 22–31)
CREAT SERPL-MCNC: 0.76 MG/DL — SIGNIFICANT CHANGE UP (ref 0.5–1.3)
CULTURE RESULTS: SIGNIFICANT CHANGE UP
EGFR: 80 ML/MIN/1.73M2 — SIGNIFICANT CHANGE UP
GLUCOSE SERPL-MCNC: 98 MG/DL — SIGNIFICANT CHANGE UP (ref 70–99)
HCT VFR BLD CALC: 42.6 % — SIGNIFICANT CHANGE UP (ref 34.5–45)
HGB BLD-MCNC: 14 G/DL — SIGNIFICANT CHANGE UP (ref 11.5–15.5)
MCHC RBC-ENTMCNC: 30.2 PG — SIGNIFICANT CHANGE UP (ref 27–34)
MCHC RBC-ENTMCNC: 32.9 GM/DL — SIGNIFICANT CHANGE UP (ref 32–36)
MCV RBC AUTO: 91.8 FL — SIGNIFICANT CHANGE UP (ref 80–100)
PLATELET # BLD AUTO: 250 K/UL — SIGNIFICANT CHANGE UP (ref 150–400)
POTASSIUM SERPL-MCNC: 3.5 MMOL/L — SIGNIFICANT CHANGE UP (ref 3.5–5.3)
POTASSIUM SERPL-SCNC: 3.5 MMOL/L — SIGNIFICANT CHANGE UP (ref 3.5–5.3)
RBC # BLD: 4.64 M/UL — SIGNIFICANT CHANGE UP (ref 3.8–5.2)
RBC # FLD: 13.4 % — SIGNIFICANT CHANGE UP (ref 10.3–14.5)
SODIUM SERPL-SCNC: 144 MMOL/L — SIGNIFICANT CHANGE UP (ref 135–145)
SPECIMEN SOURCE: SIGNIFICANT CHANGE UP
WBC # BLD: 7.63 K/UL — SIGNIFICANT CHANGE UP (ref 3.8–10.5)
WBC # FLD AUTO: 7.63 K/UL — SIGNIFICANT CHANGE UP (ref 3.8–10.5)

## 2024-04-07 PROCEDURE — 99239 HOSP IP/OBS DSCHRG MGMT >30: CPT

## 2024-04-07 PROCEDURE — 99233 SBSQ HOSP IP/OBS HIGH 50: CPT

## 2024-04-07 RX ORDER — ALBUTEROL 90 UG/1
2 AEROSOL, METERED ORAL
Qty: 1 | Refills: 0
Start: 2024-04-07 | End: 2024-05-06

## 2024-04-07 RX ORDER — ALBUTEROL 90 UG/1
2 AEROSOL, METERED ORAL EVERY 6 HOURS
Refills: 0 | Status: DISCONTINUED | OUTPATIENT
Start: 2024-04-07 | End: 2024-04-07

## 2024-04-07 RX ADMIN — HEPARIN SODIUM 5000 UNIT(S): 5000 INJECTION INTRAVENOUS; SUBCUTANEOUS at 13:07

## 2024-04-07 RX ADMIN — PANTOPRAZOLE SODIUM 40 MILLIGRAM(S): 20 TABLET, DELAYED RELEASE ORAL at 06:15

## 2024-04-07 RX ADMIN — HEPARIN SODIUM 5000 UNIT(S): 5000 INJECTION INTRAVENOUS; SUBCUTANEOUS at 06:14

## 2024-04-07 RX ADMIN — Medication 88 MICROGRAM(S): at 06:14

## 2024-04-07 RX ADMIN — AMLODIPINE BESYLATE 5 MILLIGRAM(S): 2.5 TABLET ORAL at 10:07

## 2024-04-07 NOTE — DISCHARGE NOTE PROVIDER - NSDCMRMEDTOKEN_GEN_ALL_CORE_FT
albuterol 90 mcg/inh inhalation aerosol: 2 puff(s) inhaled every 6 hours as needed for Shortness of Breath and/or Wheezing  amLODIPine 5 mg oral tablet: 1 tab(s) orally once a day (in the morning)  donepezil 10 mg oral tablet: 1 tab(s) orally once a day (in the evening)  levothyroxine 88 mcg (0.088 mg) oral tablet: 1 tab(s) orally once a day (in the morning)  Protonix 40 mg oral delayed release tablet: 1 tab(s) orally once a day  Refresh ophthalmic solution: 1 drop(s) in each eye 2 times a day as needed for  rivastigmine 1.5 mg oral capsule: 1 cap(s) orally 2 times a day  rosuvastatin 5 mg oral tablet: 1 tab(s) orally once a day (at bedtime)

## 2024-04-07 NOTE — DISCHARGE NOTE PROVIDER - NSDCCPCAREPLAN_GEN_ALL_CORE_FT
PRINCIPAL DISCHARGE DIAGNOSIS  Diagnosis: Syncope  Assessment and Plan of Treatment: Please follow up with Dr. Franco for MCOT placement. In the event of an unexplained syncopal episode, we recommend having your heart rhythm recorded using a cardiac monitor. Mobile Cardiac Telemetry (MCT or MCOT) is an advanced cardiac monitoring device that automatically monitors, records, and transmits the abnormal heart rhythm for 24 hours up to 30 days. It picks up the heart's electrical signals just as an ECG throughout the day and night, even when the patient is asleep. You will follow up with the Cardiologist to review the findings of the cardiac monitor.      SECONDARY DISCHARGE DIAGNOSES  Diagnosis: Long COVID  Assessment and Plan of Treatment: Continue supportive care at home. You may use the Albuterol inhaler as needed for shortness of breath or wheezing.  Please obtain a repeat CT chest in 1 month to ensure the findings have resolved. A copy of your CT scan report was attached to your discharge papers.

## 2024-04-07 NOTE — PROGRESS NOTE ADULT - SUBJECTIVE AND OBJECTIVE BOX
REQUESTING PHYSICIAN:  Dr. 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  States last stress test and echo were done 2 years ago were "normal."  Denies hx of CVA, TIA, ASHD, VHD or arrhythmias.    Returned to ER 24 with c/o syncopal episode, states she woke up, walked to bathroom, urinated and while walking back to bed, she felt sudden lightheadedness, chills and leaned against counter then slid to ground.  She did not lose consciousness.  Denies any head or body trauma, chest pain, SOB, palpitations.  States since dc home she has been increasing po fluids and voiding well.    Cardiologist:  Dr. Franco    PAST MEDICAL & SURGICAL HISTORY:  HTN (hypertension)  HLD (hyperlipidemia)  Hypothyroid  Osteogenic sarcoma  Asthma    ALLERGIES:  sulfa drugs (Unknown)  Intolerances    SOCIAL HISTORY:  former smoker  no ETOH    FAMILY  HISTORY:  Father ( of MI)      MEDICATIONS:  Home Medications:  amLODIPine 5 mg oral tablet: 1 tab(s) orally once a day (in the morning) (2024 09:55)  donepezil 10 mg oral tablet: 1 tab(s) orally once a day (in the evening) (2024 09:55)  levothyroxine 88 mcg (0.088 mg) oral tablet: 1 tab(s) orally once a day (in the morning) (2024 09:55)  Refresh ophthalmic solution: 1 drop(s) in each eye 2 times a day as needed for (2024 09:55)  rivastigmine 1.5 mg oral capsule: 1 cap(s) orally 2 times a day (2024 09:55)  rosuvastatin 5 mg oral tablet: 1 tab(s) orally once a day (at bedtime) (2024 09:55)    MEDICATIONS  (STANDING):  amLODIPine   Tablet 5 milliGRAM(s) Oral daily  atorvastatin 20 milliGRAM(s) Oral at bedtime  donepezil 10 milliGRAM(s) Oral at bedtime  heparin   Injectable 5000 Unit(s) SubCutaneous every 8 hours  levothyroxine 88 MICROGram(s) Oral daily  pantoprazole    Tablet 40 milliGRAM(s) Oral before breakfast    MEDICATIONS  (PRN):  acetaminophen     Tablet .. 650 milliGRAM(s) Oral every 6 hours PRN Temp greater or equal to 38C (100.4F), Mild Pain (1 - 3)  aluminum hydroxide/magnesium hydroxide/simethicone Suspension 30 milliLiter(s) Oral every 4 hours PRN Dyspepsia  melatonin 3 milliGRAM(s) Oral at bedtime PRN Insomnia  ondansetron Injectable 4 milliGRAM(s) IV Push every 8 hours PRN Nausea and/or Vomiting      REVIEW OF SYSTEMS:  CONSTITUTIONAL: No weakness, fevers, +chills  EYES/ENT: No visual changes;  No vertigo or throat pain   NECK: No pain or stiffness  RESPIRATORY: No cough, wheezing, hemoptysis; No shortness of breath  CARDIOVASCULAR: No chest pain or palpitations  GASTROINTESTINAL: No abdominal or epigastric pain. No nausea, vomiting, or hematemesis;   No diarrhea or constipation. No melena or hematochezia.  GENITOURINARY: No dysuria, frequency or hematuria  NEUROLOGICAL: No numbness or weakness; +lightheaded  SKIN: No itching, burning, rashes, or lesions   All other review of systems is negative unless indicated above      Vital Signs Last 24 Hrs  T(C): 36.9 (:), Max: 36.9 (:)  T(F): 98.4 (:), Max: 98.4 (:)  HR: 66 (:) (66 - 73)  BP: 139/81 (:) (121/80 - 139/81)  BP(mean): --  RR: 18 (:) (18 - 18)  SpO2: 98% (:) (97% - 99%)    Parameters below as of :  Patient On (Oxygen Delivery Method): room air      I&O's Summary    Daily Height in cm: 160.02 (2024 22:24)        PHYSICAL EXAM:  Constitutional: NAD, awake and alert, well-developed  HEENT: PERR, EOMI,  No oral cyananosis.  Neck:  supple,  No JVD  Respiratory: decreased at bases (L>R), No wheezing, rales or rhonchi  Cardiovascular: S1 and S2, regular rate and rhythm, no Murmurs, gallops or rubs  Gastrointestinal: Bowel Sounds present, soft, nontender.   Extremities: No peripheral edema. No clubbing or cyanosis.  Vascular: 2+ peripheral pulses  Neurological: A/O x 3, no focal deficits  Musculoskeletal: no calf tenderness.  Skin: No rashes.      LABS:                         13.8   10.21 )-----------( 249      ( 2024 23:44 )             39.8     2024 10:58    142    |  111    |  14     ----------------------------<  102    3.9     |  27     |  0.68   2024 23:44    139    |  104    |  19     ----------------------------<  71     2.5     |  25     |  0.89     Ca    8.9        2024 10:58  Ca    9.3        2024 23:44    TPro  7.1    /  Alb  3.9    /  TBili  0.5    /  DBili  x      /  AST  37     /  ALT  48     /  AlkPhos  82     2024 23:44  PT/INR - ( 2024 23:44 )   PT: 10.6 sec;   INR: 0.94 ratio    PTT - ( 2024 23:44 )  PTT:23.9 sec    CARDIAC BIOMARKERS:  Pro-Brain Natriuretic Peptide: 89 pg/mL (24 @ 23:44)   -BNP VALUES:   @ 16:50  Pro Bnp 68    TROPONIN VALUES:  Troponin I, High Sensitivity Result: 3.93 ng/L (24 @ 23:44)  Troponin I, High Sensitivity Result: 4.81 ng/L (24 @ 18:08)  Troponin I, High Sensitivity Result: 4.46 ng/L (24 @ 08:16)  Troponin I, High Sensitivity Result: 4.94 ng/L (24 @ 05:18)  Troponin I, High Sensitivity Result: 5.54 ng/L (22 @ 16:50)    CULTURES:  Culture - Urine (24 @ 09:15)   <10,000 CFU/mL Normal Urogenital Tia  Culture - Blood (24 @ 05:18)   No growth at 5 days  Culture - Blood (24 @ 05:18)   No growth at 5 days  Respiratory Viral Panel with COVID-19 by MUKUND (22 @ 16:50)   Rapid RVP Result: NotDetec      EKG:  < from: 12 Lead ECG (24 @ 19:06) >  Diagnosis Line Sinus rhythm [id number] degree A-V block  Possible Left atrial enlargement  Left axis deviation  Minimal voltage criteria for LVH, may be normal variant ( [place] product )  Anteroseptal infarct (cited on or before 30-MAR-2024)  Abnormal ECG  When compared with ECG of 30-MAR-2024 08:58,  No significant change was found  Confirmed by Darshana Gaona ([id number]) on 4/3/2024 10:46:25 AM    RADIOLOGY:  < from: CT Angio Chest PE Protocol w/ IV Cont (24 @ 23:47) >  IMPRESSION:  1.  No evidence of pulmonary embolus.  2.  No acute cardiac pulmonary disease.  3.  Partially solid nodules may represent evidence of ongoing   infectious/inflammatory process. Same modality follow-up in 30 days is   recommended for reassessment.    < from: Xray Chest 1 View- PORTABLE-Urgent (24 @ 23:52) >  IMPRESSION: No evidence of active chest disease.   No significant change   from prior study dated 2024.      < from: TTE Echo Complete w/o Contrast w/ Doppler (24 @ 14:26) >   Impression     Summary     The left ventricle is normal in size, wall thickness, wall motion and   contractility as seen in limited views. Estimated left ventricular   ejection fraction is 55-60 %.   Mild diastolic dysfunction (stage I).   Normal appearing right ventricle structure and function.   Minimal mitral regurgitation   Aortic valve sclerosis. Mild aortic regurgitation.   Mild to moderate tricuspid valve regurgitation.      < end of copied text >             REQUESTING PHYSICIAN:  Dr. 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  States last stress test and echo were done 2 years ago were "normal."  Denies hx of CVA, TIA, ASHD, VHD or arrhythmias.    Returned to ER 24 with c/o syncopal episode, states she woke up, walked to bathroom, urinated and while walking back to bed, she felt sudden lightheadedness, chills and leaned against counter then slid to ground.  She did not lose consciousness.  Denies any head or body trauma, chest pain, SOB, palpitations.  States since dc home she has been increasing po fluids and voiding well.    Cardiologist:  Dr. Franco    24:  Pt sitting up in bed in NAD.  Denies any chest pain, SOB, lightheadedness, dizziness.  Tele:  SR 60s.    PAST MEDICAL & SURGICAL HISTORY:  HTN (hypertension)  HLD (hyperlipidemia)  Hypothyroid  Osteogenic sarcoma  Asthma    ALLERGIES:  sulfa drugs (Unknown)  Intolerances    SOCIAL HISTORY:  former smoker  no ETOH    FAMILY  HISTORY:  Father ( of MI)      MEDICATIONS:  Home Medications:  amLODIPine 5 mg oral tablet: 1 tab(s) orally once a day (in the morning) (2024 09:55)  donepezil 10 mg oral tablet: 1 tab(s) orally once a day (in the evening) (2024 09:55)  levothyroxine 88 mcg (0.088 mg) oral tablet: 1 tab(s) orally once a day (in the morning) (2024 09:55)  Refresh ophthalmic solution: 1 drop(s) in each eye 2 times a day as needed for (2024 09:55)  rivastigmine 1.5 mg oral capsule: 1 cap(s) orally 2 times a day (2024 09:55)  rosuvastatin 5 mg oral tablet: 1 tab(s) orally once a day (at bedtime) (2024 09:55)    MEDICATIONS  (STANDING):  amLODIPine   Tablet 5 milliGRAM(s) Oral daily  atorvastatin 20 milliGRAM(s) Oral at bedtime  donepezil 10 milliGRAM(s) Oral at bedtime  heparin   Injectable 5000 Unit(s) SubCutaneous every 8 hours  levothyroxine 88 MICROGram(s) Oral daily  pantoprazole    Tablet 40 milliGRAM(s) Oral before breakfast    MEDICATIONS  (PRN):  acetaminophen     Tablet .. 650 milliGRAM(s) Oral every 6 hours PRN Temp greater or equal to 38C (100.4F), Mild Pain (1 - 3)  aluminum hydroxide/magnesium hydroxide/simethicone Suspension 30 milliLiter(s) Oral every 4 hours PRN Dyspepsia  melatonin 3 milliGRAM(s) Oral at bedtime PRN Insomnia  ondansetron Injectable 4 milliGRAM(s) IV Push every 8 hours PRN Nausea and/or Vomiting      REVIEW OF SYSTEMS:  CONSTITUTIONAL: No weakness, fevers, +chills  EYES/ENT: No visual changes;  No vertigo or throat pain   NECK: No pain or stiffness  RESPIRATORY: No cough, wheezing, hemoptysis; No shortness of breath  CARDIOVASCULAR: No chest pain or palpitations  GASTROINTESTINAL: No abdominal or epigastric pain. No nausea, vomiting, or hematemesis;   No diarrhea or constipation. No melena or hematochezia.  GENITOURINARY: No dysuria, frequency or hematuria  NEUROLOGICAL: No numbness or weakness; +lightheaded  SKIN: No itching, burning, rashes, or lesions   All other review of systems is negative unless indicated above      Vital Signs Last 24 Hrs  T(C): 36.9 (2024 01:), Max: 36.9 (2024 01:)  T(F): 98.4 (2024 01:01), Max: 98.4 (2024 01:)  HR: 66 (2024 01:) (66 - 73)  BP: 139/81 (:) (121/80 - 139/81)  BP(mean): --  RR: 18 (2024 01:) (18 - 18)  SpO2: 98% (2024 01:) (97% - 99%)    Parameters below as of 2024 01:  Patient On (Oxygen Delivery Method): room air      I&O's Summary    Daily Height in cm: 160.02 (2024 22:24)        PHYSICAL EXAM:  Constitutional: NAD, awake and alert, well-developed  HEENT: PERR, EOMI,  No oral cyananosis.  Neck:  supple,  No JVD  Respiratory: decreased at bases (L>R), No wheezing, rales or rhonchi  Cardiovascular: S1 and S2, regular rate and rhythm, no Murmurs, gallops or rubs  Gastrointestinal: Bowel Sounds present, soft, nontender.   Extremities: No peripheral edema. No clubbing or cyanosis.  Vascular: 2+ peripheral pulses  Neurological: A/O x 3, no focal deficits  Musculoskeletal: no calf tenderness.  Skin: No rashes.      LABS:                             14.0   7.63  )-----------( 250      ( 2024 08:27 )             42.6                     13.8   10.21 )-----------( 249      ( 2024 23:44 )             39.8     2024 10:58    142    |  111    |  14     ----------------------------<  102    3.9     |  27     |  0.68   2024 23:44    139    |  104    |  19     ----------------------------<  71     2.5     |  25     |  0.89     Ca    8.9        2024 10:58  Ca    9.3        2024 23:44    TPro  7.1    /  Alb  3.9    /  TBili  0.5    /  DBili  x      /  AST  37     /  ALT  48     /  AlkPhos  82     2024 23:44  PT/INR - ( 2024 23:44 )   PT: 10.6 sec;   INR: 0.94 ratio    PTT - ( 2024 23:44 )  PTT:23.9 sec    CARDIAC BIOMARKERS:  Pro-Brain Natriuretic Peptide: 89 pg/mL (24 @ 23:44)   -BNP VALUES:  12-06 @ 16:50  Pro Bnp 68    TROPONIN VALUES:  Troponin I, High Sensitivity Result: 3.93 ng/L (24 @ 23:44)  Troponin I, High Sensitivity Result: 4.81 ng/L (24 @ 18:08)  Troponin I, High Sensitivity Result: 4.46 ng/L (24 @ 08:16)  Troponin I, High Sensitivity Result: 4.94 ng/L (24 @ 05:18)  Troponin I, High Sensitivity Result: 5.54 ng/L (22 @ 16:50)    CULTURES:  Culture - Urine (24 @ 09:15)   <10,000 CFU/mL Normal Urogenital Tia  Culture - Blood (24 @ 05:18)   No growth at 5 days  Culture - Blood (24 @ 05:18)   No growth at 5 days  Respiratory Viral Panel with COVID-19 by MUKUND (22 @ 16:50)   Rapid RVP Result: NotDetec      EKG:  < from: 12 Lead ECG (24 @ 19:06) >  Diagnosis Line Sinus rhythm [id number] degree A-V block  Possible Left atrial enlargement  Left axis deviation  Minimal voltage criteria for LVH, may be normal variant ( [place] product )  Anteroseptal infarct (cited on or before 30-MAR-2024)  Abnormal ECG  When compared with ECG of 30-MAR-2024 08:58,  No significant change was found  Confirmed by Darshana Gaona ([id number]) on 4/3/2024 10:46:25 AM    RADIOLOGY:  < from: CT Angio Chest PE Protocol w/ IV Cont (24 @ 23:47) >  IMPRESSION:  1.  No evidence of pulmonary embolus.  2.  No acute cardiac pulmonary disease.  3.  Partially solid nodules may represent evidence of ongoing   infectious/inflammatory process. Same modality follow-up in 30 days is   recommended for reassessment.    < from: Xray Chest 1 View- PORTABLE-Urgent (24 @ 23:52) >  IMPRESSION: No evidence of active chest disease.   No significant change   from prior study dated 2024.      < from: TTE Echo Complete w/o Contrast w/ Doppler (24 @ 14:26) >   Impression   Summary   The left ventricle is normal in size, wall thickness, wall motion and   contractility as seen in limited views. Estimated left ventricular   ejection fraction is 55-60 %.   Mild diastolic dysfunction (stage I).   Normal appearing right ventricle structure and function.   Minimal mitral regurgitation   Aortic valve sclerosis. Mild aortic regurgitation.   Mild to moderate tricuspid valve regurgitation.

## 2024-04-07 NOTE — DISCHARGE NOTE PROVIDER - NSDCCPTREATMENT_GEN_ALL_CORE_FT
PRINCIPAL PROCEDURE  Procedure: CT chest wo/w con  Findings and Treatment:   FINDINGS:  LUNGS AND AIRWAYS: Patent central airways.  Right upper lobe cicatricial   atelectasis. Subcentimeter partially solid bilateral upper lobe nodules   could represent evidence of an ongoing infectious/inflammatory process.   No evidence of a pneumothorax.  PLEURA: No pleural effusion.  MEDIASTINUM AND MATEO: No lymphadenopathy.  VESSELS: Mild calcified atherosclerosis of the thoracic aorta. No large   pulmonary embolus.  HEART: Heart size is normal. No pericardial effusion.  CHEST WALL AND LOWER NECK: Within normal limits.  VISUALIZED UPPER ABDOMEN: Stable indeterminate hypodense lesion within   the left hepatic lobe. No acute abnormality detected.  BONES: Stable osseous structures.  IMPRESSION:  1.  No evidence of pulmonary embolus.  2.  No acute cardiac pulmonary disease.  3.  Partially solid nodules may represent evidence of ongoing   infectious/inflammatory process. Same modality follow-up in 30 days is   recommended for reassessment.      SECONDARY PROCEDURE  Procedure: TTE (transthoracic echocardiography)  Findings and Treatment: Findings   Mitral Valve   Normal appearing mitral valve structure and function.   Minimal mitral regurgitation   Aortic Valve   Aortic valve sclerosis.  Mild aortic regurgitation.   Tricuspid Valve   Normal appearing tricuspid valve structure.   Mild to moderate tricuspid valve regurgitation.   Pulmonic Valve   Normal appearing pulmonic valve structure and function.   Left Atrium   Normal appearing left atrium.   Left Ventricle   The left ventricle is normal in size, wall thickness, wall motion and   contractility as seen in limited views. Estimated left ventricular   ejection fraction is 55-60 %.   Mild diastolic dysfunction (stage I).   Right Atrium   Normal appearing right atrium.   Right Ventricle   Normal appearing right ventricle structure and function.   Pericardial Effusion   Trace pericardial effusion is present.   Pleural Effusion   No evidence of pleural effusion.   Miscellaneous   The IVC was not visualized.   Impression   Summary   The left ventricle is normal in size, wall thickness, wall motion and   contractility as seen in limited views. Estimated left ventricular   ejection fraction is 55-60 %.   Mild diastolic dysfunction (stage I).   Normal appearing right ventricle structure and function.   Minimal mitral regurgitation   Aortic valve sclerosis. Mild aortic regurgitation.   Mild to moderate tricuspid valve regurgitation.

## 2024-04-07 NOTE — DISCHARGE NOTE NURSING/CASE MANAGEMENT/SOCIAL WORK - NSDCPEFALRISK_GEN_ALL_CORE
For information on Fall & Injury Prevention, visit: [email or web address] OR  [email or web address] OR  https://www.cdc.gov/steadi/patient.html

## 2024-04-07 NOTE — PROGRESS NOTE ADULT - PROBLEM SELECTOR PLAN 1
Hx or recent COVID PNA.  EKG SR, no JAZMINE, trop and BNP neg  -  Echo (4/6)  EF 55-60, nl LV size and wall motion, mild AR, mild-mod TR  -  low suspicion for cardiac etiology, echo nl EF  -  pt should follow up with Dr. Franco ([facility]) as outpatient for MCOT.

## 2024-04-07 NOTE — DISCHARGE NOTE PROVIDER - CARE PROVIDER_API CALL
Mono Franco  Cardiology  [address] [place] [address]  Phone: [phone]  Fax: [phone]  Established Patient  Follow Up Time: 1 week

## 2024-04-07 NOTE — DISCHARGE NOTE PROVIDER - NSDCFUSCHEDAPPT_GEN_ALL_CORE_FT
Faucher Sandler, Collette  [facility] Physician [facility] [address]  Scheduled Appointment: 04/08/2024    Solo Barrios  [facility] [address]  Scheduled Appointment: 05/21/2024

## 2024-04-07 NOTE — PROGRESS NOTE ADULT - NS ATTEND AMEND GEN_ALL_CORE FT
Agree with the above.   Tele with no arrhythmia   Echo unremarkable   stable for discharge with follow up with Dr. Mono Franco

## 2024-04-07 NOTE — DISCHARGE NOTE PROVIDER - HOSPITAL COURSE
Admission HPI: The patient is a 78 yo female with Hx of HTN, Asthma , Lung nodules monitored at [facility] , Hospitalized on 3/30/24 with COVID 19 , pneumonia   discharged on 3/21/24 on Augmentin, returnee to ED on 4/2/23  c/o SOB d/c home improved, returned to the ED yesterday c/o 2 syncopal episodes, she fell light headed and fell to the  ground, denies head injury.    Interval history 4/7: Patient reports since COVID-19 infection she's been having episodes of SOB, which prompt her to come to hospital. The episodes occur randomly, not associated with exertion. She denies wheezing or chest pain. Denies anxiety or recent stressors. Patient has a history of childhood asthma, however hasn't had any problems since childhood. Patient feels back to baseline at this time. Denies SOB, chest pain, palpitations. ROS negative.     Hospital course (by problem):   1. Syncope, SOB  2. Hypokalemia   3. long COVID  4. Asthma stable  5. Pulmonary nodules    -Admitted to Telemetry    -CT PE negative for PE  -Hypokalemia resolved s/p potassium supplement  -Orthostatics negative  -EKG no acute findings  -Echo (4/6)  EF 55-60, normal LV size and wall motion, mild AR, mild-mod TR  -Cardiology consult appreciated; outpatient MCOT  -Discharge with Albuterol inhaler to use prn  -Follow up pulm nodules outpatient in 1 month with repeat imaging    Patient was discharged to: Home    Physical exam at the time of discharge:  LOS: 1d    VITALS:   T(C): 36.9 (04-07-24 @ 08:05), Max: 36.9 (04-07-24 @ 01:01)  HR: 65 (04-07-24 @ 08:05) (65 - 73)  BP: 138/81 (04-07-24 @ 08:05) (125/89 - 139/81)  RR: 18 (04-07-24 @ 08:05) (18 - 18)  SpO2: 97% (04-07-24 @ 08:05) (97% - 99%)    PHYSICAL EXAM:  GENERAL: No distress  HEAD:  Atraumatic, Normocephalic  EYES: EOMI, PERRLA, conjunctiva and sclera clear  ENMT: No tonsillar erythema, exudates, or enlargement; MMM  NECK: Supple, No JVD, Normal thyroid  HEART: Regular rate and rhythm; No murmurs, rubs, or gallops  RESPIRATORY: Unlabored respirations. CTA B/L, No W/R/R  ABDOMEN: Soft, Nontender, Nondistended; Bowel sounds present  NEUROLOGY: A&Ox3, nonfocal, moving all extremities  EXTREMITIES:  2+ Peripheral Pulses, No clubbing, cyanosis, or edema  SKIN: warm, dry, normal color, no rash or abnormal lesions

## 2024-04-07 NOTE — DISCHARGE NOTE NURSING/CASE MANAGEMENT/SOCIAL WORK - PATIENT PORTAL LINK FT
You can access the FollowMyHealth Patient Portal offered by [facility] by registering at the following website: [email or web address]. By joining [facility]’s FollowMyHealth portal, you will also be able to view your health information using other applications (apps) compatible with our system.

## 2024-04-08 ENCOUNTER — APPOINTMENT (OUTPATIENT)
Dept: OBGYN | Facility: CLINIC | Age: 79
End: 2024-04-08
Payer: MEDICARE

## 2024-04-08 ENCOUNTER — TRANSCRIPTION ENCOUNTER (OUTPATIENT)
Age: 79
End: 2024-04-08

## 2024-04-08 VITALS
WEIGHT: 100 LBS | BODY MASS INDEX: 18.4 KG/M2 | HEIGHT: 62 IN | DIASTOLIC BLOOD PRESSURE: 72 MMHG | SYSTOLIC BLOOD PRESSURE: 120 MMHG

## 2024-04-08 DIAGNOSIS — N76.0 ACUTE VAGINITIS: ICD-10-CM

## 2024-04-08 DIAGNOSIS — R23.4 CHANGES IN SKIN TEXTURE: ICD-10-CM

## 2024-04-08 PROCEDURE — 99214 OFFICE O/P EST MOD 30 MIN: CPT

## 2024-04-08 RX ORDER — CLOBETASOL PROPIONATE 0.5 MG/G
0.05 OINTMENT TOPICAL TWICE DAILY
Qty: 1 | Refills: 0 | Status: ACTIVE | COMMUNITY
Start: 2024-04-08 | End: 1900-01-01

## 2024-04-08 RX ORDER — METHYLPREDNISOLONE 4 MG/1
4 TABLET ORAL
Qty: 1 | Refills: 0 | Status: ACTIVE | COMMUNITY
Start: 2024-04-08 | End: 1900-01-01

## 2024-04-08 NOTE — CHIEF COMPLAINT
[FreeTextEntry1] : Christina presents today with c/o external vaginal irritation and itching. She leaks urine spontaneously and uses a pad.

## 2024-04-08 NOTE — PHYSICAL EXAM
[de-identified] : significant external erythema from mons pubis extending to anus; significant area of desquamation to left labia which also appears swollen

## 2024-04-09 DIAGNOSIS — R11.0 NAUSEA: ICD-10-CM

## 2024-04-09 LAB
CANDIDA VAG CYTO: NOT DETECTED
G VAGINALIS+PREV SP MTYP VAG QL MICRO: NOT DETECTED
T VAGINALIS VAG QL WET PREP: NOT DETECTED

## 2024-04-09 RX ORDER — ONDANSETRON 8 MG/1
8 TABLET ORAL EVERY 8 HOURS
Qty: 6 | Refills: 0 | Status: ACTIVE | COMMUNITY
Start: 2024-04-09 | End: 1900-01-01

## 2024-04-11 LAB
CULTURE RESULTS: SIGNIFICANT CHANGE UP
CULTURE RESULTS: SIGNIFICANT CHANGE UP
SPECIMEN SOURCE: SIGNIFICANT CHANGE UP
SPECIMEN SOURCE: SIGNIFICANT CHANGE UP

## 2024-04-12 DIAGNOSIS — E87.6 HYPOKALEMIA: ICD-10-CM

## 2024-04-12 DIAGNOSIS — Z85.830 PERSONAL HISTORY OF MALIGNANT NEOPLASM OF BONE: ICD-10-CM

## 2024-04-12 DIAGNOSIS — Z88.2 ALLERGY STATUS TO SULFONAMIDES: ICD-10-CM
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
## 2024-04-12 DIAGNOSIS — I10 ESSENTIAL (PRIMARY) HYPERTENSION: ICD-10-CM

## 2024-04-12 DIAGNOSIS — E03.9 HYPOTHYROIDISM, UNSPECIFIED: ICD-10-CM

## 2024-04-12 DIAGNOSIS — R55 SYNCOPE AND COLLAPSE: ICD-10-CM

## 2024-04-12 DIAGNOSIS — I08.2 RHEUMATIC DISORDERS OF BOTH AORTIC AND TRICUSPID VALVES: ICD-10-CM

## 2024-04-12 DIAGNOSIS — Z79.890 HORMONE REPLACEMENT THERAPY: ICD-10-CM

## 2024-04-12 DIAGNOSIS — E78.5 HYPERLIPIDEMIA, UNSPECIFIED: ICD-10-CM
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
## 2024-04-12 DIAGNOSIS — U09.9 POST COVID-19 CONDITION, UNSPECIFIED: ICD-10-CM

## 2024-04-12 DIAGNOSIS — Z87.01 PERSONAL HISTORY OF PNEUMONIA (RECURRENT): ICD-10-CM

## 2024-04-12 DIAGNOSIS — R91.8 OTHER NONSPECIFIC ABNORMAL FINDING OF LUNG FIELD: ICD-10-CM

## 2024-04-12 DIAGNOSIS — J45.909 UNSPECIFIED ASTHMA, UNCOMPLICATED: ICD-10-CM

## 2024-04-19 ENCOUNTER — APPOINTMENT (OUTPATIENT)
Dept: COLORECTAL SURGERY | Facility: CLINIC | Age: 79
End: 2024-04-19

## 2024-04-23 ENCOUNTER — APPOINTMENT (OUTPATIENT)
Dept: OBGYN | Facility: CLINIC | Age: 79
End: 2024-04-23
Payer: MEDICARE

## 2024-04-23 VITALS
DIASTOLIC BLOOD PRESSURE: 72 MMHG | BODY MASS INDEX: 18.4 KG/M2 | SYSTOLIC BLOOD PRESSURE: 122 MMHG | WEIGHT: 100 LBS | HEIGHT: 62 IN

## 2024-04-23 PROCEDURE — 99213 OFFICE O/P EST LOW 20 MIN: CPT

## 2024-04-23 NOTE — HISTORY OF PRESENT ILLNESS
[FreeTextEntry1] : 78 YO F PRESENTS FOR FOLLOW UP ON LICHEN SCLEROSUS. PT WAS SEEN ON 4/8/24 FOR VULVAR IRRITATION AND WAS PRESCRIBED CLOBETASOL-BTMZ CREAM AS WELL AS A MEDROL PACK. PT STATES HER IRRITATION/ITCHING HAS SIGNIFICANTLY IMPROVED SINCE USING TOPICAL THERAPY. DENIES DYSURIA, VAGINAL DISCHARGE OR ITCHING FROM WITHIN THE VAGINA.

## 2024-04-23 NOTE — PHYSICAL EXAM
[Chaperone Declined] : Patient declined chaperone [Appropriately responsive] : appropriately responsive [Alert] : alert [No Acute Distress] : no acute distress [Oriented x3] : oriented x3 [Vulvar Atrophy] : vulvar atrophy [Labia Majora] : normal [Labia Minora] : normal [Normal] : normal [Atrophy] : atrophy [Erythematous] : erythema [FreeTextEntry1] : PT STILL PRESENTS WITH SOME SWELLING AND ERYTHEMA FROM THE CLITORIS DOWN TOWARDS ANUS BUT NO THICKENING/WHITE PATCHES OF SKIN NOTED.

## 2024-04-23 NOTE — PLAN
[FreeTextEntry1] : DISCUSSED CONTINUED USE OF CLOTIRMAZOLE-BTMZ FOR LICHEN SCLEROSIS  ADVISED PT TO REFRAIN FROM HARSH FRAGRANCE SOAPS, DETERGENTS ETC.  DISCUSSED USE OF VAGINAL MOISTURIZERS VS. VAGINAL ESTROGEN. PT WISHES TO CONTINUE WITH TOPICAL THERAPY ONLY WITH CLOTRIM.-BTMZ CREAM.  ALL QUESTIONS ANSWERED. F/U FOR ANNUAL OR PRN REVIEWED ENCOUNTER AND CARE PLAN. TG

## 2024-05-17 ENCOUNTER — INPATIENT (INPATIENT)
Facility: HOSPITAL | Age: 79
LOS: 2 days | Discharge: HOME | DRG: 189 | End: 2024-05-20
Attending: STUDENT IN AN ORGANIZED HEALTH CARE EDUCATION/TRAINING PROGRAM | Admitting: INTERNAL MEDICINE
Payer: MEDICARE

## 2024-05-17 VITALS
WEIGHT: 121.25 LBS | OXYGEN SATURATION: 100 % | TEMPERATURE: 97 F | HEIGHT: 63 IN | SYSTOLIC BLOOD PRESSURE: 145 MMHG | RESPIRATION RATE: 20 BRPM | DIASTOLIC BLOOD PRESSURE: 103 MMHG | HEART RATE: 89 BPM

## 2024-05-17 LAB
ALBUMIN SERPL ELPH-MCNC: 4.1 G/DL — SIGNIFICANT CHANGE UP (ref 3.3–5)
ALP SERPL-CCNC: 78 U/L — SIGNIFICANT CHANGE UP (ref 40–120)
ALT FLD-CCNC: 54 U/L — SIGNIFICANT CHANGE UP (ref 12–78)
ANION GAP SERPL CALC-SCNC: 10 MMOL/L — SIGNIFICANT CHANGE UP (ref 5–17)
APTT BLD: 25.3 SEC — SIGNIFICANT CHANGE UP (ref 24.5–35.6)
AST SERPL-CCNC: 62 U/L — HIGH (ref 15–37)
BASE EXCESS BLDV CALC-SCNC: 3.3 MMOL/L — HIGH (ref -2–3)
BASOPHILS # BLD AUTO: 0.09 K/UL — SIGNIFICANT CHANGE UP (ref 0–0.2)
BASOPHILS NFR BLD AUTO: 0.8 % — SIGNIFICANT CHANGE UP (ref 0–2)
BILIRUB SERPL-MCNC: 0.5 MG/DL — SIGNIFICANT CHANGE UP (ref 0.2–1.2)
BUN SERPL-MCNC: 24 MG/DL — HIGH (ref 7–23)
CALCIUM SERPL-MCNC: 9.4 MG/DL — SIGNIFICANT CHANGE UP (ref 8.5–10.1)
CHLORIDE SERPL-SCNC: 108 MMOL/L — SIGNIFICANT CHANGE UP (ref 96–108)
CO2 SERPL-SCNC: 22 MMOL/L — SIGNIFICANT CHANGE UP (ref 22–31)
CREAT SERPL-MCNC: 0.82 MG/DL — SIGNIFICANT CHANGE UP (ref 0.5–1.3)
D DIMER BLD IA.RAPID-MCNC: 405 NG/ML DDU — HIGH
EGFR: 73 ML/MIN/1.73M2 — SIGNIFICANT CHANGE UP
EOSINOPHIL # BLD AUTO: 0.12 K/UL — SIGNIFICANT CHANGE UP (ref 0–0.5)
EOSINOPHIL NFR BLD AUTO: 1 % — SIGNIFICANT CHANGE UP (ref 0–6)
GAS PNL BLDV: SIGNIFICANT CHANGE UP
GLUCOSE SERPL-MCNC: 140 MG/DL — HIGH (ref 70–99)
HCO3 BLDV-SCNC: 28 MMOL/L — SIGNIFICANT CHANGE UP (ref 22–29)
HCT VFR BLD CALC: 42 % — SIGNIFICANT CHANGE UP (ref 34.5–45)
HGB BLD-MCNC: 14.2 G/DL — SIGNIFICANT CHANGE UP (ref 11.5–15.5)
IMM GRANULOCYTES NFR BLD AUTO: 0.3 % — SIGNIFICANT CHANGE UP (ref 0–0.9)
INR BLD: 0.89 RATIO — SIGNIFICANT CHANGE UP (ref 0.85–1.18)
LACTATE SERPL-SCNC: 4.6 MMOL/L — CRITICAL HIGH (ref 0.7–2)
LYMPHOCYTES # BLD AUTO: 1.26 K/UL — SIGNIFICANT CHANGE UP (ref 1–3.3)
LYMPHOCYTES # BLD AUTO: 11 % — LOW (ref 13–44)
MCHC RBC-ENTMCNC: 30.8 PG — SIGNIFICANT CHANGE UP (ref 27–34)
MCHC RBC-ENTMCNC: 33.8 GM/DL — SIGNIFICANT CHANGE UP (ref 32–36)
MCV RBC AUTO: 91.1 FL — SIGNIFICANT CHANGE UP (ref 80–100)
MONOCYTES # BLD AUTO: 0.62 K/UL — SIGNIFICANT CHANGE UP (ref 0–0.9)
MONOCYTES NFR BLD AUTO: 5.4 % — SIGNIFICANT CHANGE UP (ref 2–14)
NEUTROPHILS # BLD AUTO: 9.31 K/UL — HIGH (ref 1.8–7.4)
NEUTROPHILS NFR BLD AUTO: 81.5 % — HIGH (ref 43–77)
NT-PROBNP SERPL-SCNC: 57 PG/ML — SIGNIFICANT CHANGE UP (ref 0–450)
PCO2 BLDV: 41 MMHG — SIGNIFICANT CHANGE UP (ref 39–42)
PH BLDV: 7.44 — HIGH (ref 7.32–7.43)
PLATELET # BLD AUTO: 241 K/UL — SIGNIFICANT CHANGE UP (ref 150–400)
PO2 BLDV: 193 MMHG — HIGH (ref 25–45)
POTASSIUM SERPL-MCNC: 3.3 MMOL/L — LOW (ref 3.5–5.3)
POTASSIUM SERPL-SCNC: 3.3 MMOL/L — LOW (ref 3.5–5.3)
PROT SERPL-MCNC: 7.1 GM/DL — SIGNIFICANT CHANGE UP (ref 6–8.3)
PROTHROM AB SERPL-ACNC: 10.1 SEC — SIGNIFICANT CHANGE UP (ref 9.5–13)
RBC # BLD: 4.61 M/UL — SIGNIFICANT CHANGE UP (ref 3.8–5.2)
RBC # FLD: 12.3 % — SIGNIFICANT CHANGE UP (ref 10.3–14.5)
SAO2 % BLDV: 99 % — HIGH (ref 67–88)
SODIUM SERPL-SCNC: 140 MMOL/L — SIGNIFICANT CHANGE UP (ref 135–145)
TROPONIN I, HIGH SENSITIVITY RESULT: 3.58 NG/L — SIGNIFICANT CHANGE UP
WBC # BLD: 11.44 K/UL — HIGH (ref 3.8–10.5)
WBC # FLD AUTO: 11.44 K/UL — HIGH (ref 3.8–10.5)

## 2024-05-17 PROCEDURE — 99285 EMERGENCY DEPT VISIT HI MDM: CPT

## 2024-05-17 PROCEDURE — 93010 ELECTROCARDIOGRAM REPORT: CPT

## 2024-05-17 PROCEDURE — 71045 X-RAY EXAM CHEST 1 VIEW: CPT | Mod: 26

## 2024-05-17 RX ORDER — SODIUM CHLORIDE 9 MG/ML
1000 INJECTION INTRAMUSCULAR; INTRAVENOUS; SUBCUTANEOUS ONCE
Refills: 0 | Status: COMPLETED | OUTPATIENT
Start: 2024-05-17 | End: 2024-05-17

## 2024-05-17 RX ORDER — VANCOMYCIN HCL 1 G
1000 VIAL (EA) INTRAVENOUS ONCE
Refills: 0 | Status: COMPLETED | OUTPATIENT
Start: 2024-05-17 | End: 2024-05-17

## 2024-05-17 RX ORDER — PIPERACILLIN AND TAZOBACTAM 4; .5 G/20ML; G/20ML
3.38 INJECTION, POWDER, LYOPHILIZED, FOR SOLUTION INTRAVENOUS ONCE
Refills: 0 | Status: COMPLETED | OUTPATIENT
Start: 2024-05-17 | End: 2024-05-17

## 2024-05-17 RX ADMIN — SODIUM CHLORIDE 1000 MILLILITER(S): 9 INJECTION INTRAMUSCULAR; INTRAVENOUS; SUBCUTANEOUS at 23:35

## 2024-05-17 RX ADMIN — PIPERACILLIN AND TAZOBACTAM 200 GRAM(S): 4; .5 INJECTION, POWDER, LYOPHILIZED, FOR SOLUTION INTRAVENOUS at 23:34

## 2024-05-17 NOTE — PROVIDER CONTACT NOTE (CRITICAL VALUE NOTIFICATION) - DATE AND TIME:
"Chief Complaint  ADHD    Subjective    History of Present Illness      Patient presents to [facility] for   Patient is here today for three month follow up on ADHD medication. He states he has issues with not being able to sleep. a He states he does have about two weeks left from previous bottle so doesn't need them yet today. He would like to go down to 40 MG instead.       ADHD/Mood HPI    Visit for:  follow-up. Most recent visit was 3 months ago.  Interim changes to follow up on today: no change in medication  Work/School Performance:  going well  Cognitive:  able to focus    Behavior  Hyperactivity: is not hyperactive  Impulsivity: no impulsivity  Tasking: able to initiate tasks    Social  ADHD social/impulsive symptoms:  not impatient    Behavioral health  Behavior: no concerns  Emotional coping: demonstrates feelings of no concerns       Review of Systems   Constitutional: Negative.    HENT: Negative.    Eyes: Negative.    Respiratory: Negative.    Cardiovascular: Negative.    Gastrointestinal: Negative.    Endocrine: Negative.    Genitourinary: Negative.    Musculoskeletal: Negative.    Skin: Negative.    Allergic/Immunologic: Negative.    Neurological: Negative.    Hematological: Negative.    Psychiatric/Behavioral: Positive for sleep disturbance.       I have reviewed and agree with the HPI and ROS information as above.  Tricia Bazan, APRN     Objective   Vital Signs:   /84   Pulse 108   Ht 188 cm (74.02\")   Wt (!) 148 kg (326 lb)   SpO2 98%   BMI 41.84 kg/m²     Class 3 Severe Obesity (BMI >=40). Obesity-related health conditions include the following: none. Obesity is unchanged. BMI is is above average; BMI management plan is completed. We discussed low calorie, low carb based diet program, portion control and increasing exercise.      Physical Exam  Constitutional:       Appearance: Normal appearance. He is well-developed. He is obese.   HENT:      Head: " Normocephalic and atraumatic.      Right Ear: External ear normal.      Left Ear: External ear normal.      Nose: Nose normal. No nasal tenderness or congestion.      Mouth/Throat:      Lips: Pink. No lesions.      Mouth: Mucous membranes are moist. No oral lesions.      Dentition: Normal dentition.      Pharynx: Oropharynx is clear. No pharyngeal swelling, oropharyngeal exudate or posterior oropharyngeal erythema.   Eyes:      General: Lids are normal. Vision grossly intact. No scleral icterus.        Right eye: No discharge.         Left eye: No discharge.      Extraocular Movements: Extraocular movements intact.      Conjunctiva/sclera: Conjunctivae normal.      Right eye: Right conjunctiva is not injected.      Left eye: Left conjunctiva is not injected.      Pupils: Pupils are equal, round, and reactive to light.   Cardiovascular:      Rate and Rhythm: Normal rate and regular rhythm.      Heart sounds: Normal heart sounds. No murmur heard.    No gallop.   Pulmonary:      Effort: Pulmonary effort is normal.      Breath sounds: Normal breath sounds and air entry. No wheezing, rhonchi or rales.   Musculoskeletal:         General: No tenderness or deformity. Normal range of motion.      Cervical back: Full passive range of motion without pain, normal range of motion and neck supple.      Right lower leg: No edema.      Left lower leg: No edema.   Skin:     General: Skin is warm and dry.      Coloration: Skin is not jaundiced.      Findings: No rash.   Neurological:      Mental Status: He is alert and oriented to person, place, and time.      Cranial Nerves: Cranial nerves are intact.      Sensory: Sensation is intact.      Motor: Motor function is intact.      Coordination: Coordination is intact.      Gait: Gait is intact.   Psychiatric:         Attention and Perception: Attention normal.         Mood and Affect: Mood and affect normal.         Behavior: Behavior is not hyperactive. Behavior is cooperative.          Thought Content: Thought content normal.         Judgment: Judgment normal.          MINI-7:      PHQ-2 Depression Screening  Little interest or pleasure in doing things?     Feeling down, depressed, or hopeless?     PHQ-2 Total Score       PHQ-9 Depression Screening  Little interest or pleasure in doing things?     Feeling down, depressed, or hopeless?     Trouble falling or staying asleep, or sleeping too much?     Feeling tired or having little energy?     Poor appetite or overeating?     Feeling bad about yourself - or that you are a failure or have let yourself or your family down?     Trouble concentrating on things, such as reading the newspaper or watching television?     Moving or speaking so slowly that other people could have noticed? Or the opposite - being so fidgety or restless that you have been moving around a lot more than usual?     Thoughts that you would be better off dead, or of hurting yourself in some way?     PHQ-9 Total Score     If you checked off any problems, how difficult have these problems made it for you to do your work, take care of things at home, or get along with other people?        Result Review  Data Reviewed:   The following data was reviewed by: TEJA Vinson on 08/25/2022:  Urine Drug Screen - Urine, Clean Catch (03/23/2022 13:17)           Assessment and Plan      Problem List Items Addressed This Visit        Endocrine and Metabolic    Class 3 severe obesity with body mass index (BMI) of 40.0 to 44.9 in adult ([facility])       Mental Health    Attention-deficit hyperactivity disorder, combined type - Primary      Other Visit Diagnoses     Insomnia, unspecified type              Here today for an ADHD follow-up.  He states his current dose of Vyvanse is helping with focus throughout the day.  He is questioning if this dose is causing him to have trouble sleeping at night.  He states that at night he can feel the medication wear off and feels like he cannot focus on  17-May-2024 23:03 anything.  He states at that point he cannot go to sleep.  He is questioning lowering the dose.  However, he does state that there are times when he takes his medication later in the day.  He is trying to get on a better schedule and routine with taking his medications and going to bed at a certain time.    Plan:    1.  Has recently filled his Vyvanse 50 mg prescription.  Instructed patient to open the capsule and dump some of the medication out to get closer to a 40 mg dose if he would like to try a lower dose at this time.  Encouraged patient to get a more routine schedule with getting up in the mornings, taking his medication at an appropriate time, and going to bed around the same time each night.  Discussed good sleep hygiene.  Offered medication for sleep, patient declines at this time.  Follow-up 1 month to reassess and see if he would like to continue his current dose of Vyvanse or lower his dose.    Follow Up   Return in about 1 month (around 9/25/2022) for Recheck.  Patient was given instructions and counseling regarding his condition or for health maintenance advice. Please see specific information pulled into the AVS if appropriate.     ADHD Follow up:    Randy report initiated in office and is clean. ADRS (Adult ADHD Assessment Form) reviewed in detail, scanned in chart, and has been reviewed at time of appointment.  All questions, including medication and side effects, were discussed in detail at time of patient's visit. Patient will continue same medication which was discussed at today's visit. Patient is to return in 1 month(s).    Last Urine Toxicity     LAST URINE TOXICITY RESULTS Latest Ref Rng & Units 3/23/2022    AMPHETAMINES SCREEN, URINE Negative Negative    BARBITURATES SCREEN Negative Negative    BENZODIAZEPINE SCREEN, URINE Negative Negative    BUPRENORPHINEUR Negative Negative    COCAINE SCREEN, URINE Negative Negative    METHADONE SCREEN, URINE Negative Negative    METHAMPHETAMINEUR  Negative Negative           Urine Drug Screen Results: UDS RESULTS: Current results appropriate

## 2024-05-17 NOTE — ED ADULT TRIAGE NOTE - CHIEF COMPLAINT QUOTE
Ambulance to ER from home with c/o shortness of breath x 30 min. Patient received 2 duonebs, 10mg dexamethasone, 2G magnesium sulfate prior to arrival. Ambulance to ER from home with c/o shortness of breath x 30 min. Patient received 2 duonebs, 10mg dexamethasone, 2G magnesium sulfate prior to arrival. Patient on non rebreather saturating 100% no respiratory distress noted.

## 2024-05-18 DIAGNOSIS — R06.02 SHORTNESS OF BREATH: ICD-10-CM

## 2024-05-18 LAB
APPEARANCE UR: CLEAR — SIGNIFICANT CHANGE UP
BILIRUB UR-MCNC: NEGATIVE — SIGNIFICANT CHANGE UP
COLOR SPEC: YELLOW — SIGNIFICANT CHANGE UP
CULTURE RESULTS: SIGNIFICANT CHANGE UP
DIFF PNL FLD: NEGATIVE — SIGNIFICANT CHANGE UP
FLUAV AG NPH QL: SIGNIFICANT CHANGE UP
FLUBV AG NPH QL: SIGNIFICANT CHANGE UP
GLUCOSE UR QL: NEGATIVE MG/DL — SIGNIFICANT CHANGE UP
KETONES UR-MCNC: 15 MG/DL
LACTATE SERPL-SCNC: 1.5 MMOL/L — SIGNIFICANT CHANGE UP (ref 0.7–2)
LACTATE SERPL-SCNC: 2.5 MMOL/L — HIGH (ref 0.7–2)
LEUKOCYTE ESTERASE UR-ACNC: NEGATIVE — SIGNIFICANT CHANGE UP
NITRITE UR-MCNC: NEGATIVE — SIGNIFICANT CHANGE UP
PH UR: 7.5 — SIGNIFICANT CHANGE UP (ref 5–8)
PROT UR-MCNC: NEGATIVE MG/DL — SIGNIFICANT CHANGE UP
RSV RNA NPH QL NAA+NON-PROBE: SIGNIFICANT CHANGE UP
SARS-COV-2 RNA SPEC QL NAA+PROBE: SIGNIFICANT CHANGE UP
SP GR SPEC: 1.02 — SIGNIFICANT CHANGE UP (ref 1–1.03)
SPECIMEN SOURCE: SIGNIFICANT CHANGE UP
UROBILINOGEN FLD QL: 0.2 MG/DL — SIGNIFICANT CHANGE UP (ref 0.2–1)

## 2024-05-18 PROCEDURE — 71275 CT ANGIOGRAPHY CHEST: CPT | Mod: 26,MC

## 2024-05-18 PROCEDURE — 94640 AIRWAY INHALATION TREATMENT: CPT

## 2024-05-18 PROCEDURE — 99223 1ST HOSP IP/OBS HIGH 75: CPT

## 2024-05-18 PROCEDURE — 80048 BASIC METABOLIC PNL TOTAL CA: CPT

## 2024-05-18 PROCEDURE — 36415 COLL VENOUS BLD VENIPUNCTURE: CPT

## 2024-05-18 PROCEDURE — 83605 ASSAY OF LACTIC ACID: CPT

## 2024-05-18 PROCEDURE — 85027 COMPLETE CBC AUTOMATED: CPT

## 2024-05-18 RX ORDER — ATORVASTATIN CALCIUM 80 MG/1
20 TABLET, FILM COATED ORAL AT BEDTIME
Refills: 0 | Status: DISCONTINUED | OUTPATIENT
Start: 2024-05-18 | End: 2024-05-20

## 2024-05-18 RX ORDER — ALBUTEROL 90 UG/1
2 AEROSOL, METERED ORAL EVERY 6 HOURS
Refills: 0 | Status: DISCONTINUED | OUTPATIENT
Start: 2024-05-18 | End: 2024-05-20

## 2024-05-18 RX ORDER — LEVOTHYROXINE SODIUM 125 MCG
88 TABLET ORAL DAILY
Refills: 0 | Status: DISCONTINUED | OUTPATIENT
Start: 2024-05-18 | End: 2024-05-20

## 2024-05-18 RX ORDER — DONEPEZIL HYDROCHLORIDE 10 MG/1
5 TABLET, FILM COATED ORAL AT BEDTIME
Refills: 0 | Status: DISCONTINUED | OUTPATIENT
Start: 2024-05-18 | End: 2024-05-20

## 2024-05-18 RX ORDER — RIVASTIGMINE 4.6 MG/24H
1 PATCH, EXTENDED RELEASE TRANSDERMAL
Refills: 0 | DISCHARGE

## 2024-05-18 RX ORDER — ACETAMINOPHEN 500 MG
650 TABLET ORAL EVERY 6 HOURS
Refills: 0 | Status: DISCONTINUED | OUTPATIENT
Start: 2024-05-18 | End: 2024-05-20

## 2024-05-18 RX ORDER — AZITHROMYCIN 500 MG/1
500 TABLET, FILM COATED ORAL EVERY 24 HOURS
Refills: 0 | Status: DISCONTINUED | OUTPATIENT
Start: 2024-05-19 | End: 2024-05-20

## 2024-05-18 RX ORDER — AMLODIPINE BESYLATE 2.5 MG/1
1 TABLET ORAL
Refills: 0 | DISCHARGE

## 2024-05-18 RX ORDER — ROSUVASTATIN CALCIUM 5 MG/1
1 TABLET ORAL
Refills: 0 | DISCHARGE

## 2024-05-18 RX ORDER — AZITHROMYCIN 500 MG/1
TABLET, FILM COATED ORAL
Refills: 0 | Status: DISCONTINUED | OUTPATIENT
Start: 2024-05-18 | End: 2024-05-20

## 2024-05-18 RX ORDER — PANTOPRAZOLE SODIUM 20 MG/1
40 TABLET, DELAYED RELEASE ORAL
Refills: 0 | Status: DISCONTINUED | OUTPATIENT
Start: 2024-05-18 | End: 2024-05-20

## 2024-05-18 RX ORDER — AMLODIPINE BESYLATE 2.5 MG/1
5 TABLET ORAL DAILY
Refills: 0 | Status: DISCONTINUED | OUTPATIENT
Start: 2024-05-18 | End: 2024-05-20

## 2024-05-18 RX ORDER — SODIUM CHLORIDE 9 MG/ML
1000 INJECTION INTRAMUSCULAR; INTRAVENOUS; SUBCUTANEOUS
Refills: 0 | Status: DISCONTINUED | OUTPATIENT
Start: 2024-05-18 | End: 2024-05-18

## 2024-05-18 RX ORDER — POTASSIUM CHLORIDE 20 MEQ
20 PACKET (EA) ORAL ONCE
Refills: 0 | Status: COMPLETED | OUTPATIENT
Start: 2024-05-18 | End: 2024-05-18

## 2024-05-18 RX ORDER — LANOLIN ALCOHOL/MO/W.PET/CERES
3 CREAM (GRAM) TOPICAL AT BEDTIME
Refills: 0 | Status: DISCONTINUED | OUTPATIENT
Start: 2024-05-18 | End: 2024-05-20

## 2024-05-18 RX ORDER — ENOXAPARIN SODIUM 100 MG/ML
40 INJECTION SUBCUTANEOUS EVERY 24 HOURS
Refills: 0 | Status: DISCONTINUED | OUTPATIENT
Start: 2024-05-18 | End: 2024-05-20

## 2024-05-18 RX ORDER — AZITHROMYCIN 500 MG/1
500 TABLET, FILM COATED ORAL ONCE
Refills: 0 | Status: COMPLETED | OUTPATIENT
Start: 2024-05-18 | End: 2024-05-18

## 2024-05-18 RX ORDER — DONEPEZIL HYDROCHLORIDE 10 MG/1
1 TABLET, FILM COATED ORAL
Refills: 0 | DISCHARGE

## 2024-05-18 RX ORDER — ONDANSETRON 8 MG/1
4 TABLET, FILM COATED ORAL EVERY 8 HOURS
Refills: 0 | Status: DISCONTINUED | OUTPATIENT
Start: 2024-05-18 | End: 2024-05-20

## 2024-05-18 RX ORDER — LEVOTHYROXINE SODIUM 125 MCG
1 TABLET ORAL
Refills: 0 | DISCHARGE

## 2024-05-18 RX ORDER — BUDESONIDE AND FORMOTEROL FUMARATE DIHYDRATE 160; 4.5 UG/1; UG/1
2 AEROSOL RESPIRATORY (INHALATION)
Refills: 0 | Status: DISCONTINUED | OUTPATIENT
Start: 2024-05-18 | End: 2024-05-20

## 2024-05-18 RX ADMIN — Medication 88 MICROGRAM(S): at 15:35

## 2024-05-18 RX ADMIN — Medication 20 MILLIEQUIVALENT(S): at 01:58

## 2024-05-18 RX ADMIN — AZITHROMYCIN 255 MILLIGRAM(S): 500 TABLET, FILM COATED ORAL at 09:41

## 2024-05-18 RX ADMIN — ATORVASTATIN CALCIUM 20 MILLIGRAM(S): 80 TABLET, FILM COATED ORAL at 21:47

## 2024-05-18 RX ADMIN — PANTOPRAZOLE SODIUM 40 MILLIGRAM(S): 20 TABLET, DELAYED RELEASE ORAL at 15:35

## 2024-05-18 RX ADMIN — Medication 20 MILLIGRAM(S): at 21:47

## 2024-05-18 RX ADMIN — Medication 3 MILLIGRAM(S): at 21:47

## 2024-05-18 RX ADMIN — AMLODIPINE BESYLATE 5 MILLIGRAM(S): 2.5 TABLET ORAL at 09:40

## 2024-05-18 RX ADMIN — Medication 250 MILLIGRAM(S): at 01:59

## 2024-05-18 RX ADMIN — Medication 20 MILLIGRAM(S): at 15:36

## 2024-05-18 RX ADMIN — ENOXAPARIN SODIUM 40 MILLIGRAM(S): 100 INJECTION SUBCUTANEOUS at 15:35

## 2024-05-18 RX ADMIN — DONEPEZIL HYDROCHLORIDE 5 MILLIGRAM(S): 10 TABLET, FILM COATED ORAL at 21:48

## 2024-05-18 NOTE — ED PROVIDER NOTE - IV ALTEPLASE EXCL REL HIDDEN
[facility] (Hospital)  Discharge Note  Short Stay    Procedure(s) (LRB):  COLONOSCOPY (N/A)      OUTCOME: Patient tolerated treatment/procedure well without complication and is now ready for discharge.    DISPOSITION: Home or Self Care    FINAL DIAGNOSIS:  Encounter for screening colonoscopy    FOLLOWUP: With primary care provider    DISCHARGE INSTRUCTIONS:  No discharge procedures on file.        show

## 2024-05-18 NOTE — H&P ADULT - ASSESSMENT
79 year old female with history of Asthma, COPD dementia, HLD and HTN presents to ED with SOB. Patient is slightly forgetful but reports increased SOB, cough and wheeze since yesterday morning. She denies any chest pain. Cough is non productive. She denies any sick contacts.     # Acute hypoxic respiratory failure secondary to COPD exacerbation  - Admit to medicine  - Solumedrol 20 q 8  - Symbicort  - Albuterol  - Azithro  - Pulm Consult   - Monitor 02 sat  - CT neg for PE/PNA    # Leukocytosis  - Secondary to above  - Trend    # Elevated Lactate  - IVF  - Repeat     # Dementia  - Continue donepezil    # HLD  - Continue atorvastatin    # HTN  - Stable  - Continue amlodipine    # DVT prophylaxis  - Lovenox

## 2024-05-18 NOTE — H&P ADULT - NSHPLABSRESULTS_GEN_ALL_CORE
LABS:                          14.2   11.44 )-----------( 241      ( 17 May 2024 21:48 )             42.0         140  |  108  |  24<H>  ----------------------------<  140<H>  3.3<L>   |  22  |  0.82    Ca    9.4      17 May 2024 21:48    TPro  7.1  /  Alb  4.1  /  TBili  0.5  /  DBili  x   /  AST  62<H>  /  ALT  54  /  AlkPhos  78          LIVER FUNCTIONS - ( 17 May 2024 21:48 )  Alb: 4.1 g/dL / Pro: 7.1 gm/dL / ALK PHOS: 78 U/L / ALT: 54 U/L / AST: 62 U/L / GGT: x           PT/INR - ( 17 May 2024 21:48 )   PT: 10.1 sec;   INR: 0.89 ratio    PTT - ( 17 May 2024 21:48 )  PTT:25.3 sec    Urinalysis Basic - ( 18 May 2024 00:50 )  Color: Yellow / Appearance: Clear / S.016 / pH: x  Gluc: x / Ketone: 15 mg/dL  / Bili: Negative / Urobili: 0.2 mg/dL   Blood: x / Protein: Negative mg/dL / Nitrite: Negative   Leuk Esterase: Negative / RBC: x / WBC x   Sq Epi: x / Non Sq Epi: x / Bacteria: x    Pro BNP 57  Troponin negative    EKG NSR no acute changes      Lactate, Blood: 2.5 mmol/L ( @ 00:50)  Lactate, Blood: 4.6 mmol/L ( @ 21:48)    : CT Angio Chest PE Protocol w/ IV Cont (24 @ 00:41) >    IMPRESSION:    No pulmonary embolism.    Pulmonary nodules unchanged since 2022. Continued follow-up is   recommended.      --- End of Report ---    < end of copied text >

## 2024-05-18 NOTE — ED PROVIDER NOTE - CLINICAL SUMMARY MEDICAL DECISION MAKING FREE TEXT BOX
89-year-old female with acute onset shortness of breath prior to arrival.  Patient given nebs, Decadron, magnesium by EMS with improvement in symptoms.  Screening EKG, chest x-ray labs and likely admit.  Signed to Dr. Funes at midnight pending CT angio rule out PE and admission.

## 2024-05-18 NOTE — H&P ADULT - NSHPPHYSICALEXAM_GEN_ALL_CORE
Vital Signs Last 24 Hrs  T(C): 36.5 (18 May 2024 04:25), Max: 36.5 (18 May 2024 04:25)  T(F): 97.7 (18 May 2024 04:25), Max: 97.7 (18 May 2024 04:25)  HR: 95 (18 May 2024 07:30) (87 - 97)  BP: 130/78 (18 May 2024 07:30) (103/72 - 148/81)  BP(mean): 89 (18 May 2024 04:25) (82 - 101)  RR: 14 (18 May 2024 07:30) (14 - 20)  SpO2: 100% (18 May 2024 07:30) (98% - 100%)    PHYSICAL EXAM:  GENERAL: NAD, well-groomed, well-developed  HEENT - NC/AT, pupils equal and reactive to light,  ; Moist mucous membranes, Good dentition, No lesions  NECK: Supple, No JVD  CHEST/LUNG: Trace wheeze bilaterally  HEART: Regular rate and rhythm; No murmurs, rubs, or gallops  ABDOMEN: Soft, Nontender, Nondistended; Bowel sounds present  EXTREMITIES:  2+ Peripheral Pulses, No clubbing, cyanosis, or edema  NEURO:  No Focal deficits, sensory and motor intact  SKIN: No rashes or lesions

## 2024-05-18 NOTE — ED PROVIDER NOTE - ENMT, MLM
Airway patent, Nasal mucosa clear. Mouth with normal mucosa. Throat has no vesicles, no oropharyngeal exudates and uvula is midline.
33

## 2024-05-18 NOTE — CONSULT NOTE ADULT - SUBJECTIVE AND OBJECTIVE BOX
HPI:  79 year old female with history of Asthma, dementia, HLD and HTN presents to ED with SOB. Patient is slightly forgetful but reports increased SOB, cough and wheeze since yesterday morning. She denies any chest pain. Cough is non productive. She denies any sick contacts.  (18 May 2024 07:46)      PAST MEDICAL & SURGICAL HISTORY:  HTN (hypertension)      HLD (hyperlipidemia)      Hypothyroid      Osteogenic sarcoma      Asthma          Home Medications:  amLODIPine 5 mg oral tablet: 1 tab(s) orally once a day (in the morning) (2024 09:55)  donepezil 10 mg oral tablet: 1 tab(s) orally once a day (in the evening) (2024 09:55)  levothyroxine 88 mcg (0.088 mg) oral tablet: 1 tab(s) orally once a day (in the morning) (2024 09:55)  Refresh ophthalmic solution: 1 drop(s) in each eye 2 times a day as needed for (2024 09:55)  rivastigmine 1.5 mg oral capsule: 1 cap(s) orally 2 times a day (2024 09:55)  rosuvastatin 5 mg oral tablet: 1 tab(s) orally once a day (at bedtime) (2024 09:55)      MEDICATIONS  (STANDING):  amLODIPine   Tablet 5 milliGRAM(s) Oral daily  atorvastatin 20 milliGRAM(s) Oral at bedtime  azithromycin  IVPB      azithromycin  IVPB 500 milliGRAM(s) IV Intermittent once  budesonide 160 MICROgram(s)/formoterol 4.5 MICROgram(s) Inhaler 2 Puff(s) Inhalation two times a day  donepezil 5 milliGRAM(s) Oral at bedtime  enoxaparin Injectable 40 milliGRAM(s) SubCutaneous every 24 hours  levothyroxine 88 MICROGram(s) Oral daily  methylPREDNISolone sodium succinate Injectable 20 milliGRAM(s) IV Push every 8 hours  pantoprazole    Tablet 40 milliGRAM(s) Oral before breakfast  sodium chloride 0.9%. 1000 milliLiter(s) (125 mL/Hr) IV Continuous <Continuous>    MEDICATIONS  (PRN):  acetaminophen     Tablet .. 650 milliGRAM(s) Oral every 6 hours PRN Temp greater or equal to 38C (100.4F), Mild Pain (1 - 3)  albuterol    90 MICROgram(s) HFA Inhaler 2 Puff(s) Inhalation every 6 hours PRN Shortness of Breath and/or Wheezing  aluminum hydroxide/magnesium hydroxide/simethicone Suspension 30 milliLiter(s) Oral every 4 hours PRN Dyspepsia  melatonin 3 milliGRAM(s) Oral at bedtime PRN Insomnia  ondansetron Injectable 4 milliGRAM(s) IV Push every 8 hours PRN Nausea and/or Vomiting      Allergies    sulfa drugs (Unknown)    Intolerances        SOCIAL HISTORY: Denies tobacco, etoh abuse or illicit drug use    FAMILY HISTORY:      Vital Signs Last 24 Hrs  T(C): 36.5 (18 May 2024 04:25), Max: 36.5 (18 May 2024 04:25)  T(F): 97.7 (18 May 2024 04:25), Max: 97.7 (18 May 2024 04:25)  HR: 95 (18 May 2024 07:30) (87 - 97)  BP: 130/78 (18 May 2024 07:30) (103/72 - 148/81)  BP(mean): 89 (18 May 2024 04:25) (82 - 101)  RR: 14 (18 May 2024 07:30) (14 - 20)  SpO2: 100% (18 May 2024 07:30) (98% - 100%)    Parameters below as of 18 May 2024 07:30  Patient On (Oxygen Delivery Method): nasal cannula  O2 Flow (L/min): 2          REVIEW OF SYSTEMS:    CONSTITUTIONAL:  As per HPI.  HEENT:  Eyes:  No diplopia or blurred vision. ENT:  No earache, sore throat or runny nose.  CARDIOVASCULAR:  No pressure, squeezing, tightness, heaviness or aching about the chest, neck, axilla or epigastrium.  RESPIRATORY:  No cough, shortness of breath, PND or orthopnea.  GASTROINTESTINAL:  No nausea, vomiting or diarrhea.  GENITOURINARY:  No dysuria, frequency or urgency.  MUSCULOSKELETAL:  As per HPI.  SKIN:  No change in skin, hair or nails.  NEUROLOGIC:  No paresthesias, fasciculations, seizures or weakness.  PSYCHIATRIC:  No disorder of thought or mood.  ENDOCRINE:  No heat or cold intolerance, polyuria or polydipsia.  HEMATOLOGICAL:  No easy bruising or bleedings:  .     PHYSICAL EXAMINATION:    GENERAL APPEARANCE:  Pt. is not currently dyspneic, in no distress. Pt. is alert, oriented, and pleasant.  HEENT:  Pupils are normal and react normally. No icterus. Mucous membranes well colored.  NECK:  Supple. No lymphadenopathy. Jugular venous pressure not elevated. Carotids equal.   HEART:   The cardiac impulse has a normal quality. Regular. Normal S1 and S2. There are no murmurs, rubs or gallops noted  CHEST:  Chest is clear to auscultation. Normal respiratory effort.  ABDOMEN:  Soft and nontender.   EXTREMITIES:  There is no cyanosis, clubbing or edema.   SKIN:  No rash or significant lesions are noted.    LABS:                        14.2   11.44 )-----------( 241      ( 17 May 2024 21:48 )             42.0         140  |  108  |  24<H>  ----------------------------<  140<H>  3.3<L>   |  22  |  0.82    Ca    9.4      17 May 2024 21:48    TPro  7.1  /  Alb  4.1  /  TBili  0.5  /  DBili  x   /  AST  62<H>  /  ALT  54  /  AlkPhos  78      LIVER FUNCTIONS - ( 17 May 2024 21:48 )  Alb: 4.1 g/dL / Pro: 7.1 gm/dL / ALK PHOS: 78 U/L / ALT: 54 U/L / AST: 62 U/L / GGT: x           PT/INR - ( 17 May 2024 21:48 )   PT: 10.1 sec;   INR: 0.89 ratio         PTT - ( 17 May 2024 21:48 )  PTT:25.3 sec      Urinalysis Basic - ( 18 May 2024 00:50 )    Color: Yellow / Appearance: Clear / S.016 / pH: x  Gluc: x / Ketone: 15 mg/dL  / Bili: Negative / Urobili: 0.2 mg/dL   Blood: x / Protein: Negative mg/dL / Nitrite: Negative   Leuk Esterase: Negative / RBC: x / WBC x   Sq Epi: x / Non Sq Epi: x / Bacteria: x            RADIOLOGY & ADDITIONAL STUDIES:        HPI:    79 year old female with history of Asthma, dementia, HLD and HTN presents to ED with SOB. Patient is slightly forgetful but reports increased SOB, cough and wheeze since yesterday morning. She denies any chest pain. Cough is non productive. She denies any sick contacts. pat seen for pulmonary eval, sitting in bed, no respiratory distress.    PAST MEDICAL & SURGICAL HISTORY:  HTN (hypertension)      HLD (hyperlipidemia)      Hypothyroid      Osteogenic sarcoma      Asthma          Home Medications:  amLODIPine 5 mg oral tablet: 1 tab(s) orally once a day (in the morning) (2024 09:55)  donepezil 10 mg oral tablet: 1 tab(s) orally once a day (in the evening) (2024 09:55)  levothyroxine 88 mcg (0.088 mg) oral tablet: 1 tab(s) orally once a day (in the morning) (2024 09:55)  Refresh ophthalmic solution: 1 drop(s) in each eye 2 times a day as needed for (2024 09:55)  rivastigmine 1.5 mg oral capsule: 1 cap(s) orally 2 times a day (2024 09:55)  rosuvastatin 5 mg oral tablet: 1 tab(s) orally once a day (at bedtime) (2024 09:55)      MEDICATIONS  (STANDING):  amLODIPine   Tablet 5 milliGRAM(s) Oral daily  atorvastatin 20 milliGRAM(s) Oral at bedtime  azithromycin  IVPB      azithromycin  IVPB 500 milliGRAM(s) IV Intermittent once  budesonide 160 MICROgram(s)/formoterol 4.5 MICROgram(s) Inhaler 2 Puff(s) Inhalation two times a day  donepezil 5 milliGRAM(s) Oral at bedtime  enoxaparin Injectable 40 milliGRAM(s) SubCutaneous every 24 hours  levothyroxine 88 MICROGram(s) Oral daily  methylPREDNISolone sodium succinate Injectable 20 milliGRAM(s) IV Push every 8 hours  pantoprazole    Tablet 40 milliGRAM(s) Oral before breakfast  sodium chloride 0.9%. 1000 milliLiter(s) (125 mL/Hr) IV Continuous <Continuous>    MEDICATIONS  (PRN):  acetaminophen     Tablet .. 650 milliGRAM(s) Oral every 6 hours PRN Temp greater or equal to 38C (100.4F), Mild Pain (1 - 3)  albuterol    90 MICROgram(s) HFA Inhaler 2 Puff(s) Inhalation every 6 hours PRN Shortness of Breath and/or Wheezing  aluminum hydroxide/magnesium hydroxide/simethicone Suspension 30 milliLiter(s) Oral every 4 hours PRN Dyspepsia  melatonin 3 milliGRAM(s) Oral at bedtime PRN Insomnia  ondansetron Injectable 4 milliGRAM(s) IV Push every 8 hours PRN Nausea and/or Vomiting      Allergies    sulfa drugs (Unknown)    Intolerances        SOCIAL HISTORY: Denies tobacco, etoh abuse or illicit drug use    FAMILY HISTORY:      Vital Signs Last 24 Hrs  T(C): 36.5 (18 May 2024 04:25), Max: 36.5 (18 May 2024 04:25)  T(F): 97.7 (18 May 2024 04:25), Max: 97.7 (18 May 2024 04:25)  HR: 95 (18 May 2024 07:30) (87 - 97)  BP: 130/78 (18 May 2024 07:30) (103/72 - 148/81)  BP(mean): 89 (18 May 2024 04:25) (82 - 101)  RR: 14 (18 May 2024 07:30) (14 - 20)  SpO2: 100% (18 May 2024 07:30) (98% - 100%)    Parameters below as of 18 May 2024 07:30  Patient On (Oxygen Delivery Method): nasal cannula  O2 Flow (L/min): 2          REVIEW OF SYSTEMS:    CONSTITUTIONAL:  As per HPI.  HEENT:  Eyes:  No diplopia or blurred vision. ENT:  No earache, sore throat or runny nose.  CARDIOVASCULAR:  No pressure, squeezing, tightness, heaviness or aching about the chest, neck, axilla or epigastrium.  RESPIRATORY:  No cough, +shortness of breath, PND or orthopnea.  GASTROINTESTINAL:  No nausea, vomiting or diarrhea.  GENITOURINARY:  No dysuria, frequency or urgency.  MUSCULOSKELETAL:  As per HPI.  SKIN:  No change in skin, hair or nails.  NEUROLOGIC:  No paresthesias, fasciculations, seizures or weakness.  PSYCHIATRIC:  No disorder of thought or mood.  ENDOCRINE:  No heat or cold intolerance, polyuria or polydipsia.  HEMATOLOGICAL:  No easy bruising or bleedings:  .     PHYSICAL EXAMINATION:    GENERAL APPEARANCE:  Pt. is not currently dyspneic, in no distress. Pt. is alert, oriented, and pleasant.  HEENT:  Pupils are normal and react normally. No icterus. Mucous membranes well colored.  NECK:  Supple. No lymphadenopathy. Jugular venous pressure not elevated. Carotids equal.   HEART:   The cardiac impulse has a normal quality. Regular. Normal S1 and S2. There are no murmurs, rubs or gallops noted  CHEST:  Chest crackles to auscultation. Normal respiratory effort.  ABDOMEN:  Soft and nontender.   EXTREMITIES:  There is no cyanosis, clubbing or edema.   SKIN:  No rash or significant lesions are noted.    LABS:                        14.2   11.44 )-----------( 241      ( 17 May 2024 21:48 )             42.0         140  |  108  |  24<H>  ----------------------------<  140<H>  3.3<L>   |  22  |  0.82    Ca    9.4      17 May 2024 21:48    TPro  7.1  /  Alb  4.1  /  TBili  0.5  /  DBili  x   /  AST  62<H>  /  ALT  54  /  AlkPhos  78      LIVER FUNCTIONS - ( 17 May 2024 21:48 )  Alb: 4.1 g/dL / Pro: 7.1 gm/dL / ALK PHOS: 78 U/L / ALT: 54 U/L / AST: 62 U/L / GGT: x           PT/INR - ( 17 May 2024 21:48 )   PT: 10.1 sec;   INR: 0.89 ratio         PTT - ( 17 May 2024 21:48 )  PTT:25.3 sec      Urinalysis Basic - ( 18 May 2024 00:50 )    Color: Yellow / Appearance: Clear / S.016 / pH: x  Gluc: x / Ketone: 15 mg/dL  / Bili: Negative / Urobili: 0.2 mg/dL   Blood: x / Protein: Negative mg/dL / Nitrite: Negative   Leuk Esterase: Negative / RBC: x / WBC x   Sq Epi: x / Non Sq Epi: x / Bacteria: x            RADIOLOGY & ADDITIONAL STUDIES:     CT Angio Chest PE Protocol w/ IV Cont (24 @ 00:41) >   No pulmonary embolism.  Patient is status post upper   lobe wedge resection.  8 mm groundglass nodule in the left apex (2:20) unchanged since 2022.  5 mm right upper lobe lung nodule unchanged since 2022 (2:27).  There are 2 nodules in the lingula measuring up to 4 mm, unchanged.   Mild focal thickening along the left major fissure, unchanged.

## 2024-05-18 NOTE — ED ADULT NURSE REASSESSMENT NOTE - NS ED NURSE REASSESS COMMENT FT1
Received care of patient A&Ox4, hospitalist at bedside evaluating patient. Pt VSS, O2 lowered from 4L to 2L, maintaining O2 sat. Pt updated on plan of care and expresses no needs at this time.

## 2024-05-18 NOTE — ED PROVIDER NOTE - OBJECTIVE STATEMENT
Patient Is a 79-year-old female who presents via EMS for acute onset of shortness of breath this evening.   Per EMS patient with acute wheezing diffuse given multiple DuoNebs, decadron, magnesium   With improvement in patient symptoms.  Patient is a former smoker many many years ago no history of any COPD or asthma per daughter.  patient with recent COVID and diagnosis of COVID-pneumonia but no other recent respiratory illnesses.  No chest pain at this time.   Shortness of breath has improved upon arrival to the emergency department.

## 2024-05-18 NOTE — ED ADULT NURSE REASSESSMENT NOTE - NS ED NURSE REASSESS COMMENT FT1
Pt c/o pain with IV in Right AC.. IV flushes flush with no edema but pt c/o pain. PIV removed, pressure applied with hemostasis achieved. PIV in Left AC placed by EMS, flushes easily with no pain reported. Pt c/o pain with IV in Right AC.. IV flushes flush with no edema but pt c/o pain. PIV removed, pressure applied with hemostasis achieved. PIV in Left AC placed by EMS, flushes easily with no pain reported. Pt provided with hot meal tray. Pt O2 sat 98% on room air. VSS. Pt expresses no needs at this time.

## 2024-05-18 NOTE — ED PROVIDER NOTE - EYES, MLM
Clear bilaterally, pupils equal, round and reactive to light. PAST MEDICAL HISTORY:  COPD (chronic obstructive pulmonary disease)     HTN (hypertension)     Hypercholesteremia     Stroke 5 YEARS AGO.

## 2024-05-18 NOTE — ED ADULT NURSE REASSESSMENT NOTE - NS ED NURSE REASSESS COMMENT FT1
Pt transported to 5S with daughter Carlita at bedside. Pt's iPad, phone, both chargers and pink purse on lap and clothing on back of stretcher.

## 2024-05-18 NOTE — H&P ADULT - HISTORY OF PRESENT ILLNESS
79 year old female with history of Asthma, dementia, HLD and HTN presents to ED with SOB. Patient is slightly forgetful but reports increased SOB, cough and wheeze since yesterday morning. She denies any chest pain. Cough is non productive. She denies any sick contacts.

## 2024-05-18 NOTE — PATIENT PROFILE ADULT - FALL HARM RISK - HARM RISK INTERVENTIONS

## 2024-05-18 NOTE — ED PROVIDER NOTE - HEME LYMPH, MLM
Ongoing SW/CM Assessment/Plan of Care Note     See SW/CM flowsheets for goals and other objective data.    Progress note:   SW spoke with pt over the phone. Pt confirmed he is homeless and confirmed interest in going to the [facility] COVID-19 [facility]olation Facility located at [address] [place] [address] ([employer]). SW spoke with [facility] (p: [phone]; f: [phone]) and provided brief report. Informed [facility] that pt is homeless, tested positive for COVID-19, is independent with his ADLs, and if accepted will arrive with two weeks worth of medications. [facility] informed this writer that pt is accepted to their isolation facility. Clinicals faxed to [facility] at 09:10, per his request. Pt can arrive anytime between 11:00 and 19:00 today. Pt has T19 and can transport via [facility] (previously known as [facility]). SW called [facility] (p: [phone]) and tentatively scheduled transport for 14:00 today.  will p/u from the front entrance and will call when they are on their way or when they are here.     13:28: Discharge summary faxed to  [facility].    Tamiko BOYCE, APSW  Pager: [phone]  Phone: [phone]     No adenopathy or splenomegaly. No cervical or inguinal lymphadenopathy.

## 2024-05-19 LAB
ANION GAP SERPL CALC-SCNC: 6 MMOL/L — SIGNIFICANT CHANGE UP (ref 5–17)
BUN SERPL-MCNC: 19 MG/DL — SIGNIFICANT CHANGE UP (ref 7–23)
CALCIUM SERPL-MCNC: 9.2 MG/DL — SIGNIFICANT CHANGE UP (ref 8.5–10.1)
CHLORIDE SERPL-SCNC: 113 MMOL/L — HIGH (ref 96–108)
CO2 SERPL-SCNC: 24 MMOL/L — SIGNIFICANT CHANGE UP (ref 22–31)
CREAT SERPL-MCNC: 0.62 MG/DL — SIGNIFICANT CHANGE UP (ref 0.5–1.3)
EGFR: 91 ML/MIN/1.73M2 — SIGNIFICANT CHANGE UP
GLUCOSE SERPL-MCNC: 132 MG/DL — HIGH (ref 70–99)
HCT VFR BLD CALC: 38.5 % — SIGNIFICANT CHANGE UP (ref 34.5–45)
HGB BLD-MCNC: 12.7 G/DL — SIGNIFICANT CHANGE UP (ref 11.5–15.5)
MCHC RBC-ENTMCNC: 30.3 PG — SIGNIFICANT CHANGE UP (ref 27–34)
MCHC RBC-ENTMCNC: 33 GM/DL — SIGNIFICANT CHANGE UP (ref 32–36)
MCV RBC AUTO: 91.9 FL — SIGNIFICANT CHANGE UP (ref 80–100)
PLATELET # BLD AUTO: 228 K/UL — SIGNIFICANT CHANGE UP (ref 150–400)
POTASSIUM SERPL-MCNC: 4.1 MMOL/L — SIGNIFICANT CHANGE UP (ref 3.5–5.3)
POTASSIUM SERPL-SCNC: 4.1 MMOL/L — SIGNIFICANT CHANGE UP (ref 3.5–5.3)
RBC # BLD: 4.19 M/UL — SIGNIFICANT CHANGE UP (ref 3.8–5.2)
RBC # FLD: 12.8 % — SIGNIFICANT CHANGE UP (ref 10.3–14.5)
SODIUM SERPL-SCNC: 143 MMOL/L — SIGNIFICANT CHANGE UP (ref 135–145)
WBC # BLD: 12.46 K/UL — HIGH (ref 3.8–10.5)
WBC # FLD AUTO: 12.46 K/UL — HIGH (ref 3.8–10.5)

## 2024-05-19 PROCEDURE — 99232 SBSQ HOSP IP/OBS MODERATE 35: CPT

## 2024-05-19 RX ADMIN — ATORVASTATIN CALCIUM 20 MILLIGRAM(S): 80 TABLET, FILM COATED ORAL at 21:56

## 2024-05-19 RX ADMIN — Medication 20 MILLIGRAM(S): at 05:10

## 2024-05-19 RX ADMIN — Medication 88 MICROGRAM(S): at 05:08

## 2024-05-19 RX ADMIN — AZITHROMYCIN 255 MILLIGRAM(S): 500 TABLET, FILM COATED ORAL at 08:51

## 2024-05-19 RX ADMIN — ENOXAPARIN SODIUM 40 MILLIGRAM(S): 100 INJECTION SUBCUTANEOUS at 13:34

## 2024-05-19 RX ADMIN — DONEPEZIL HYDROCHLORIDE 5 MILLIGRAM(S): 10 TABLET, FILM COATED ORAL at 21:56

## 2024-05-19 RX ADMIN — AMLODIPINE BESYLATE 5 MILLIGRAM(S): 2.5 TABLET ORAL at 09:57

## 2024-05-19 RX ADMIN — PANTOPRAZOLE SODIUM 40 MILLIGRAM(S): 20 TABLET, DELAYED RELEASE ORAL at 05:08

## 2024-05-19 RX ADMIN — Medication 20 MILLIGRAM(S): at 21:56

## 2024-05-19 RX ADMIN — BUDESONIDE AND FORMOTEROL FUMARATE DIHYDRATE 2 PUFF(S): 160; 4.5 AEROSOL RESPIRATORY (INHALATION) at 22:17

## 2024-05-20 ENCOUNTER — TRANSCRIPTION ENCOUNTER (OUTPATIENT)
Age: 79
End: 2024-05-20

## 2024-05-20 VITALS — OXYGEN SATURATION: 96 %

## 2024-05-20 LAB
ANION GAP SERPL CALC-SCNC: 8 MMOL/L — SIGNIFICANT CHANGE UP (ref 5–17)
BUN SERPL-MCNC: 17 MG/DL — SIGNIFICANT CHANGE UP (ref 7–23)
CALCIUM SERPL-MCNC: 8.9 MG/DL — SIGNIFICANT CHANGE UP (ref 8.5–10.1)
CHLORIDE SERPL-SCNC: 110 MMOL/L — HIGH (ref 96–108)
CO2 SERPL-SCNC: 24 MMOL/L — SIGNIFICANT CHANGE UP (ref 22–31)
CREAT SERPL-MCNC: 0.7 MG/DL — SIGNIFICANT CHANGE UP (ref 0.5–1.3)
EGFR: 88 ML/MIN/1.73M2 — SIGNIFICANT CHANGE UP
GLUCOSE SERPL-MCNC: 129 MG/DL — HIGH (ref 70–99)
HCT VFR BLD CALC: 39 % — SIGNIFICANT CHANGE UP (ref 34.5–45)
HGB BLD-MCNC: 13.1 G/DL — SIGNIFICANT CHANGE UP (ref 11.5–15.5)
MCHC RBC-ENTMCNC: 30.4 PG — SIGNIFICANT CHANGE UP (ref 27–34)
MCHC RBC-ENTMCNC: 33.6 GM/DL — SIGNIFICANT CHANGE UP (ref 32–36)
MCV RBC AUTO: 90.5 FL — SIGNIFICANT CHANGE UP (ref 80–100)
PLATELET # BLD AUTO: 213 K/UL — SIGNIFICANT CHANGE UP (ref 150–400)
POTASSIUM SERPL-MCNC: 3.7 MMOL/L — SIGNIFICANT CHANGE UP (ref 3.5–5.3)
POTASSIUM SERPL-SCNC: 3.7 MMOL/L — SIGNIFICANT CHANGE UP (ref 3.5–5.3)
RBC # BLD: 4.31 M/UL — SIGNIFICANT CHANGE UP (ref 3.8–5.2)
RBC # FLD: 12.7 % — SIGNIFICANT CHANGE UP (ref 10.3–14.5)
SODIUM SERPL-SCNC: 142 MMOL/L — SIGNIFICANT CHANGE UP (ref 135–145)
WBC # BLD: 11.73 K/UL — HIGH (ref 3.8–10.5)
WBC # FLD AUTO: 11.73 K/UL — HIGH (ref 3.8–10.5)

## 2024-05-20 PROCEDURE — 99239 HOSP IP/OBS DSCHRG MGMT >30: CPT

## 2024-05-20 RX ORDER — BUDESONIDE AND FORMOTEROL FUMARATE DIHYDRATE 160; 4.5 UG/1; UG/1
1 AEROSOL RESPIRATORY (INHALATION)
Qty: 1 | Refills: 0
Start: 2024-05-20 | End: 2024-06-18

## 2024-05-20 RX ORDER — ALBUTEROL 90 UG/1
2 AEROSOL, METERED ORAL
Qty: 1 | Refills: 0
Start: 2024-05-20 | End: 2024-06-18

## 2024-05-20 RX ADMIN — AZITHROMYCIN 255 MILLIGRAM(S): 500 TABLET, FILM COATED ORAL at 07:59

## 2024-05-20 RX ADMIN — PANTOPRAZOLE SODIUM 40 MILLIGRAM(S): 20 TABLET, DELAYED RELEASE ORAL at 05:40

## 2024-05-20 RX ADMIN — AMLODIPINE BESYLATE 5 MILLIGRAM(S): 2.5 TABLET ORAL at 09:08

## 2024-05-20 RX ADMIN — Medication 88 MICROGRAM(S): at 05:40

## 2024-05-20 RX ADMIN — Medication 20 MILLIGRAM(S): at 09:08

## 2024-05-20 RX ADMIN — BUDESONIDE AND FORMOTEROL FUMARATE DIHYDRATE 2 PUFF(S): 160; 4.5 AEROSOL RESPIRATORY (INHALATION) at 09:47

## 2024-05-20 NOTE — DISCHARGE NOTE PROVIDER - NSDCQMSTROKE_NEU_ALL_CORE
CV Surgery    Updated daughter April via phone. She is aware that the hospitalist team will take over attending role. All questions answered.    No

## 2024-05-20 NOTE — DISCHARGE NOTE PROVIDER - CARE PROVIDER_API CALL
Conrado Ya  Pulmonary Disease  [address] [place] [address]  Phone: [phone]  Fax: [phone]  Follow Up Time: 1 week    Faith Gayle  [facility]  [address] [place] [address]  Phone: [phone]  Fax: [phone]  Scheduled Appointment: 05/24/2024

## 2024-05-20 NOTE — DISCHARGE NOTE PROVIDER - HOSPITAL COURSE
79 year old female with history of Asthma, dementia, HLD and HTN presents to ED with SOB. Patient is slightly forgetful but reports increased SOB, cough and wheeze since yesterday morning. She denies any chest pain. Cough is non productive. She denies any sick contacts.    # Acute hypoxic respiratory failure secondary to Asthma/ suspected COPD exacerbation  - Solumedrol 20 q 8 --> q12h now  - Symbicort  - Albuterol  - Azithro  - Pulm Consult appreciated  - Monitor 02 sat  - CT neg for PE/PNA  - Urine and blood cultures NTD  - follow up with Dr. Ya for outpt PFTs   - Pt has hx childhood asthma   - former smoker -- quit 3 years ago, pt aware of annual lung cancer screening with low dose CT chest     # Leukocytosis  - Likely  secondary to steroid use   - Trend    # Elevated Lactate  - IVF  - Repeat normal  - blood and urine cx NGTD    # Dementia  - Continue donepezil    # HLD  - Continue atorvastatin    # HTN  - Stable  - Continue amlodipine    # DVT prophylaxis  - Lovenox     d/w pt's daughter Sandhya today, plan for d/c, d/c medication, to follow up with Dr. Ya for continued management and PFTs.     pt has f/u with her PCP this Friday.

## 2024-05-20 NOTE — PROGRESS NOTE ADULT - ASSESSMENT
79 year old female with history of Asthma, COPD dementia, HLD and HTN presents to ED with SOB. Patient is slightly forgetful but reports increased SOB, cough and wheeze since yesterday morning. She denies any chest pain. Cough is non productive. She denies any sick contacts.     PROBLEMS:    Acute hypoxic respiratory failure secondary to COPD exacerbation  Leukocytosis  Elevated Lactate  Dementia  HLD  HTN    PLAN;    pulmonary stable- decd planning  IV Solumedrol 20 q12  Symbicort/Albuterol  IV Azithro  Monitor 02 sat  CT neg for PE/PNA  Leukocytosis-Trend  DVT prophylaxis-Lovenox 
79 year old female with history of Asthma, COPD dementia, HLD and HTN presents to ED with SOB. Patient is slightly forgetful but reports increased SOB, cough and wheeze since yesterday morning. She denies any chest pain. Cough is non productive. She denies any sick contacts.     # Acute hypoxic respiratory failure secondary to COPD exacerbation  - Solumedrol 20 q 8  - Symbicort  - Albuterol  - Azithro  - Pulm Consult appreciated  - Monitor 02 sat  - CT neg for PE/PNA  - Urine and blood cultures NTD    # Leukocytosis  - Secondary to above  - Trend    # Elevated Lactate  - IVF  - Repeat normal    # Dementia  - Continue donepezil    # HLD  - Continue atorvastatin    # HTN  - Stable  - Continue amlodipine    # DVT prophylaxis  - Lovenox     # Disposition: Wean steroids, possible DC 5/20  
79 year old female with history of Asthma, COPD dementia, HLD and HTN presents to ED with SOB. Patient is slightly forgetful but reports increased SOB, cough and wheeze since yesterday morning. She denies any chest pain. Cough is non productive. She denies any sick contacts.     PROBLEMS:    Acute hypoxic respiratory failure secondary to COPD exacerbation  Leukocytosis  Elevated Lactate  Dementia  HLD  HTN    PLAN;    pulmonary better  IV Solumedrol 20 q12  Symbicort/Albuterol  IV Azithro  Monitor 02 sat  CT neg for PE/PNA  Leukocytosis-Trend  DVT prophylaxis-Lovenox

## 2024-05-20 NOTE — DISCHARGE NOTE PROVIDER - NSDCMRMEDTOKEN_GEN_ALL_CORE_FT
albuterol 90 mcg/inh inhalation aerosol: 2 puff(s) inhaled every 6 hours as needed for Shortness of Breath and/or Wheezing  amLODIPine 5 mg oral tablet: 1 tab(s) orally once a day (in the morning)  budesonide-formoterol 160 mcg-4.5 mcg/inh inhalation aerosol: 1 puff(s) inhaled 2 times a day  donepezil 10 mg oral tablet: 1 tab(s) orally once a day (in the evening)  levothyroxine 88 mcg (0.088 mg) oral tablet: 1 tab(s) orally once a day (in the morning)  predniSONE 10 mg oral tablet: 1 tab(s) orally once a day Take 2 tablets twice daily for 3 days   Take 1 tablet twice daily for 3 days   Take 1 tablet daily for 3 days  Protonix 40 mg oral delayed release tablet: 1 tab(s) orally once a day  rivastigmine 3 mg oral capsule: 1 cap(s) orally 2 times a day  rosuvastatin 5 mg oral tablet: 1 tab(s) orally once a day (at bedtime)

## 2024-05-20 NOTE — DISCHARGE NOTE NURSING/CASE MANAGEMENT/SOCIAL WORK - PATIENT PORTAL LINK FT
You can access the FollowMyHealth Patient Portal offered by [facility] by registering at the following website: [email or web address]. By joining [facility]’s FollowMyHealth portal, you will also be able to view your health information using other applications (apps) compatible with our system.

## 2024-05-20 NOTE — DISCHARGE NOTE PROVIDER - ATTENDING DISCHARGE PHYSICAL EXAMINATION:
Pt seen today, feels better, no overnight issues reported, breathing well on RA, pulm exam normal    Vital Signs Last 24 Hrs  T(C): 36.7 (20 May 2024 09:10), Max: 37.1 (19 May 2024 15:13)  T(F): 98 (20 May 2024 09:10), Max: 98.8 (19 May 2024 15:13)  HR: 86 (20 May 2024 10:59) (84 - 87)  BP: 122/74 (20 May 2024 09:10) (111/71 - 129/75)  RR: 18 (20 May 2024 09:10) (16 - 18)  SpO2: 96% (20 May 2024 10:59) (95% - 96%)    Parameters below as of 20 May 2024 10:59  Patient On (Oxygen Delivery Method): room air    PHYSICAL EXAM:  GENERAL: NAD  CHEST/LUNG: Clear to auscultation bilaterally; No rales, rhonchi, wheezing. Unlabored respirations  HEART: Regular rate and rhythm; No murmurs  ABDOMEN: Bowel sounds present; Soft, Nontender, Nondistended.   EXTREMITIES:  2+ Peripheral Pulses, brisk capillary refill. No clubbing, cyanosis, or edema  NERVOUS SYSTEM:  Alert & Oriented X3, speech clear. No deficits   MSK: FROM all 4 extremities, full and equal strength

## 2024-05-20 NOTE — DISCHARGE NOTE PROVIDER - PROVIDER TOKENS
PROVIDER:[TOKEN:[[id number]],FOLLOWUP:[1 week]],PROVIDER:[TOKEN:[[id number]],SCHEDULEDAPPT:[05/24/2024]]

## 2024-05-20 NOTE — DISCHARGE NOTE PROVIDER - NSDCCPCAREPLAN_GEN_ALL_CORE_FT
PRINCIPAL DISCHARGE DIAGNOSIS  Diagnosis: Asthma with COPD (chronic obstructive pulmonary disease)  Assessment and Plan of Treatment: Admitted for shortness of breath due to asthma/suspected COPD exacerbation, improved with steroids, azithromycin and albuterol. Continue with albuterol inhaler as needed for shortness of breath and wheezing, symbicort inhaler twice daily, prednisone taper. Follow up with Dr. Ya for continued management and pulmonary function testing.      SECONDARY DISCHARGE DIAGNOSES  Diagnosis: Asthma exacerbation in COPD  Assessment and Plan of Treatment:

## 2024-05-20 NOTE — DISCHARGE NOTE PROVIDER - NSDCHHATTENDCERT_GEN_ALL_CORE
Pt to OR recovery room. Pt initially hard to arouse. Upon awakening, pt was very aggitated and complaining of severe pain. Pt was thrashing in the bed. Unable to obtain VS for several minutes because of pt movement. Unable to assess fundus due to pain movement and pain. Scant bleeding visualized on perineum. My signature below certifies that the above stated patient is homebound and upon completion of the Face-To-Face encounter, has the need for intermittent skilled nursing, physical therapy and/or speech or occupational therapy services in their home for their current diagnosis as outlined in their initial plan of care. These services will continue to be monitored by myself or another physician.

## 2024-05-20 NOTE — PROGRESS NOTE ADULT - SUBJECTIVE AND OBJECTIVE BOX
HOSPITALIST ATTENDING PROGRESS NOTE    Chart and meds reviewed.  Patient seen and examined.    CC: SOB    Subjective: Feeling much better, less sob. Slight confusion. No fever.     All other systems reviewed and found to be negative with the exception of what has been described above.    MEDICATIONS  (STANDING):  amLODIPine   Tablet 5 milliGRAM(s) Oral daily  atorvastatin 20 milliGRAM(s) Oral at bedtime  azithromycin  IVPB      azithromycin  IVPB 500 milliGRAM(s) IV Intermittent every 24 hours  budesonide 160 MICROgram(s)/formoterol 4.5 MICROgram(s) Inhaler 2 Puff(s) Inhalation two times a day  donepezil 5 milliGRAM(s) Oral at bedtime  enoxaparin Injectable 40 milliGRAM(s) SubCutaneous every 24 hours  levothyroxine 88 MICROGram(s) Oral daily  methylPREDNISolone sodium succinate Injectable 20 milliGRAM(s) IV Push every 8 hours  pantoprazole    Tablet 40 milliGRAM(s) Oral before breakfast    MEDICATIONS  (PRN):  acetaminophen     Tablet .. 650 milliGRAM(s) Oral every 6 hours PRN Temp greater or equal to 38C (100.4F), Mild Pain (1 - 3)  albuterol    90 MICROgram(s) HFA Inhaler 2 Puff(s) Inhalation every 6 hours PRN Shortness of Breath and/or Wheezing  aluminum hydroxide/magnesium hydroxide/simethicone Suspension 30 milliLiter(s) Oral every 4 hours PRN Dyspepsia  melatonin 3 milliGRAM(s) Oral at bedtime PRN Insomnia  ondansetron Injectable 4 milliGRAM(s) IV Push every 8 hours PRN Nausea and/or Vomiting    Vital Signs Last 24 Hrs  T(C): 36.9 (19 May 2024 07:50), Max: 37.3 (18 May 2024 16:21)  T(F): 98.4 (19 May 2024 07:50), Max: 99.1 (18 May 2024 16:21)  HR: 75 (19 May 2024 07:50) (75 - 89)  BP: 142/78 (19 May 2024 07:50) (109/87 - 142/78)  RR: 18 (19 May 2024 07:50) (18 - 19)  SpO2: 98% (19 May 2024 07:50) (96% - 98%)    GEN: Confused, NAD  HEENT:  pupils equal and reactive, EOMI, no oropharyngeal lesions, erythema, exudates, oral thrush  NECK:   supple, no carotid bruits  CV:  +S1, +S2, regular, no murmurs  RESP:   lungs clear to auscultation bilaterally, no wheezing, rales, rhonchi, good air entry bilaterally  GI:  abdomen soft, non-tender, non-distended, normal BS  EXT:  no clubbing, no cyanosis, no edema, no calf pain, swelling or erythema  NEURO:  AAOX2, no focal neurological deficits, follows all commands, able to move extremities spontaneously  SKIN:  no ulcers, lesions or rashes    LABS:                          12.7   12.46 )-----------( 228      ( 19 May 2024 06:44 )             38.5     05-19    143  |  113<H>  |  19  ----------------------------<  132<H>  4.1   |  24  |  0.62    Ca    9.2      19 May 2024 06:44    TPro  7.1  /  Alb  4.1  /  TBili  0.5  /  DBili  x   /  AST  62<H>  /  ALT  54  /  AlkPhos  78  05-17        LIVER FUNCTIONS - ( 17 May 2024 21:48 )  Alb: 4.1 g/dL / Pro: 7.1 gm/dL / ALK PHOS: 78 U/L / ALT: 54 U/L / AST: 62 U/L / GGT: x           PT/INR - ( 17 May 2024 21:48 )   PT: 10.1 sec;   INR: 0.89 ratio    PTT - ( 17 May 2024 21:48 )  PTT:25.3 sec    Urinalysis Basic - ( 19 May 2024 06:44 )  Color: x / Appearance: x / SG: x / pH: x  Gluc: 132 mg/dL / Ketone: x  / Bili: x / Urobili: x   Blood: x / Protein: x / Nitrite: x   Leuk Esterase: x / RBC: x / WBC x   Sq Epi: x / Non Sq Epi: x / Bacteria: x     CT Angio Chest PE Protocol w/ IV Cont (05.18.24 @ 00:41) >    IMPRESSION:    No pulmonary embolism.    Pulmonary nodules unchanged since 12/6/2022. Continued follow-up is   recommended.        
Subjective:    pat better, sitting in bed, no new complaint.    Home Medications:  amLODIPine 5 mg oral tablet: 1 tab(s) orally once a day (in the morning) (18 May 2024 10:48)  donepezil 10 mg oral tablet: 1 tab(s) orally once a day (in the evening) (18 May 2024 10:48)  levothyroxine 88 mcg (0.088 mg) oral tablet: 1 tab(s) orally once a day (in the morning) (18 May 2024 10:48)  rivastigmine 3 mg oral capsule: 1 cap(s) orally 2 times a day (18 May 2024 10:51)  rosuvastatin 5 mg oral tablet: 1 tab(s) orally once a day (at bedtime) (18 May 2024 10:48)    MEDICATIONS  (STANDING):  amLODIPine   Tablet 5 milliGRAM(s) Oral daily  atorvastatin 20 milliGRAM(s) Oral at bedtime  azithromycin  IVPB      azithromycin  IVPB 500 milliGRAM(s) IV Intermittent every 24 hours  budesonide 160 MICROgram(s)/formoterol 4.5 MICROgram(s) Inhaler 2 Puff(s) Inhalation two times a day  donepezil 5 milliGRAM(s) Oral at bedtime  enoxaparin Injectable 40 milliGRAM(s) SubCutaneous every 24 hours  levothyroxine 88 MICROGram(s) Oral daily  methylPREDNISolone sodium succinate Injectable 20 milliGRAM(s) IV Push two times a day  pantoprazole    Tablet 40 milliGRAM(s) Oral before breakfast    MEDICATIONS  (PRN):  acetaminophen     Tablet .. 650 milliGRAM(s) Oral every 6 hours PRN Temp greater or equal to 38C (100.4F), Mild Pain (1 - 3)  albuterol    90 MICROgram(s) HFA Inhaler 2 Puff(s) Inhalation every 6 hours PRN Shortness of Breath and/or Wheezing  aluminum hydroxide/magnesium hydroxide/simethicone Suspension 30 milliLiter(s) Oral every 4 hours PRN Dyspepsia  melatonin 3 milliGRAM(s) Oral at bedtime PRN Insomnia  ondansetron Injectable 4 milliGRAM(s) IV Push every 8 hours PRN Nausea and/or Vomiting      Allergies    sulfa drugs (Unknown)    Intolerances        Vital Signs Last 24 Hrs  T(C): 36.9 (19 May 2024 07:50), Max: 37.3 (18 May 2024 16:21)  T(F): 98.4 (19 May 2024 07:50), Max: 99.1 (18 May 2024 16:21)  HR: 75 (19 May 2024 07:50) (75 - 87)  BP: 142/78 (19 May 2024 07:50) (109/87 - 142/78)  BP(mean): --  RR: 18 (19 May 2024 07:50) (18 - 19)  SpO2: 98% (19 May 2024 07:50) (96% - 98%)    Parameters below as of 19 May 2024 07:50  Patient On (Oxygen Delivery Method): room air          PHYSICAL EXAMINATION:    NECK:  Supple. No lymphadenopathy. Jugular venous pressure not elevated. Carotids equal.   HEART:   The cardiac impulse has a normal quality. Reg., Nl S1 and S2.  There are no murmurs, rubs or gallops noted  CHEST:  Chest rhonchi to auscultation. Normal respiratory effort.  ABDOMEN:  Soft and nontender.   EXTREMITIES:  There is no edema.       LABS:                        12.7   12.46 )-----------( 228      ( 19 May 2024 06:44 )             38.5     05-19    143  |  113<H>  |  19  ----------------------------<  132<H>  4.1   |  24  |  0.62    Ca    9.2      19 May 2024 06:44    TPro  7.1  /  Alb  4.1  /  TBili  0.5  /  DBili  x   /  AST  62<H>  /  ALT  54  /  AlkPhos  78  05-17    PT/INR - ( 17 May 2024 21:48 )   PT: 10.1 sec;   INR: 0.89 ratio         PTT - ( 17 May 2024 21:48 )  PTT:25.3 sec  Urinalysis Basic - ( 19 May 2024 06:44 )    Color: x / Appearance: x / SG: x / pH: x  Gluc: 132 mg/dL / Ketone: x  / Bili: x / Urobili: x   Blood: x / Protein: x / Nitrite: x   Leuk Esterase: x / RBC: x / WBC x   Sq Epi: x / Non Sq Epi: x / Bacteria: x            
Subjective:    pat better, sitting in bed, no new complaints.    Home Medications:  amLODIPine 5 mg oral tablet: 1 tab(s) orally once a day (in the morning) (18 May 2024 10:48)  donepezil 10 mg oral tablet: 1 tab(s) orally once a day (in the evening) (18 May 2024 10:48)  levothyroxine 88 mcg (0.088 mg) oral tablet: 1 tab(s) orally once a day (in the morning) (18 May 2024 10:48)  rivastigmine 3 mg oral capsule: 1 cap(s) orally 2 times a day (18 May 2024 10:51)  rosuvastatin 5 mg oral tablet: 1 tab(s) orally once a day (at bedtime) (18 May 2024 10:48)    MEDICATIONS  (STANDING):  amLODIPine   Tablet 5 milliGRAM(s) Oral daily  atorvastatin 20 milliGRAM(s) Oral at bedtime  azithromycin  IVPB      azithromycin  IVPB 500 milliGRAM(s) IV Intermittent every 24 hours  budesonide 160 MICROgram(s)/formoterol 4.5 MICROgram(s) Inhaler 2 Puff(s) Inhalation two times a day  donepezil 5 milliGRAM(s) Oral at bedtime  enoxaparin Injectable 40 milliGRAM(s) SubCutaneous every 24 hours  levothyroxine 88 MICROGram(s) Oral daily  methylPREDNISolone sodium succinate Injectable 20 milliGRAM(s) IV Push two times a day  pantoprazole    Tablet 40 milliGRAM(s) Oral before breakfast    MEDICATIONS  (PRN):  acetaminophen     Tablet .. 650 milliGRAM(s) Oral every 6 hours PRN Temp greater or equal to 38C (100.4F), Mild Pain (1 - 3)  albuterol    90 MICROgram(s) HFA Inhaler 2 Puff(s) Inhalation every 6 hours PRN Shortness of Breath and/or Wheezing  aluminum hydroxide/magnesium hydroxide/simethicone Suspension 30 milliLiter(s) Oral every 4 hours PRN Dyspepsia  melatonin 3 milliGRAM(s) Oral at bedtime PRN Insomnia  ondansetron Injectable 4 milliGRAM(s) IV Push every 8 hours PRN Nausea and/or Vomiting      Allergies    sulfa drugs (Unknown)    Intolerances        Vital Signs Last 24 Hrs  T(C): 36.7 (20 May 2024 09:10), Max: 36.9 (19 May 2024 21:03)  T(F): 98 (20 May 2024 09:10), Max: 98.4 (19 May 2024 21:03)  HR: 86 (20 May 2024 10:59) (84 - 87)  BP: 122/74 (20 May 2024 09:10) (122/74 - 129/75)  BP(mean): --  RR: 18 (20 May 2024 09:10) (18 - 18)  SpO2: 96% (20 May 2024 10:59) (95% - 96%)    Parameters below as of 20 May 2024 10:59  Patient On (Oxygen Delivery Method): room air          PHYSICAL EXAMINATION:    NECK:  Supple. No lymphadenopathy. Jugular venous pressure not elevated. Carotids equal.   HEART:   The cardiac impulse has a normal quality. Reg., Nl S1 and S2.  There are no murmurs, rubs or gallops noted  CHEST:  Chest is clear to auscultation. Normal respiratory effort.  ABDOMEN:  Soft and nontender.   EXTREMITIES:  There is no edema.       LABS:                        13.1   11.73 )-----------( 213      ( 20 May 2024 06:40 )             39.0     05-20    142  |  110<H>  |  17  ----------------------------<  129<H>  3.7   |  24  |  0.70    Ca    8.9      20 May 2024 06:40        Urinalysis Basic - ( 20 May 2024 06:40 )    Color: x / Appearance: x / SG: x / pH: x  Gluc: 129 mg/dL / Ketone: x  / Bili: x / Urobili: x   Blood: x / Protein: x / Nitrite: x   Leuk Esterase: x / RBC: x / WBC x   Sq Epi: x / Non Sq Epi: x / Bacteria: x

## 2024-05-20 NOTE — GOALS OF CARE CONVERSATION - ADVANCED CARE PLANNING - CONVERSATION DETAILS
Discussion with pt today regarding goals of care, process or CPR, risks, benefits, possible complications, pt wished to be fully resuscitated at this time, will complete paper work to make her daughter Sandhya her HCP.

## 2024-05-21 ENCOUNTER — APPOINTMENT (OUTPATIENT)
Dept: OBGYN | Facility: CLINIC | Age: 79
End: 2024-05-21

## 2024-05-22 ENCOUNTER — TRANSCRIPTION ENCOUNTER (OUTPATIENT)
Age: 79
End: 2024-05-22

## 2024-05-24 ENCOUNTER — TRANSCRIPTION ENCOUNTER (OUTPATIENT)
Age: 79
End: 2024-05-24

## 2024-05-24 DIAGNOSIS — J96.01 ACUTE RESPIRATORY FAILURE WITH HYPOXIA: ICD-10-CM

## 2024-05-24 DIAGNOSIS — F03.90 UNSPECIFIED DEMENTIA WITHOUT BEHAVIORAL DISTURBANCE: ICD-10-CM

## 2024-05-24 DIAGNOSIS — J44.1 CHRONIC OBSTRUCTIVE PULMONARY DISEASE WITH (ACUTE) EXACERBATION: ICD-10-CM

## 2024-05-24 DIAGNOSIS — I10 ESSENTIAL (PRIMARY) HYPERTENSION: ICD-10-CM

## 2024-05-24 DIAGNOSIS — Z88.2 ALLERGY STATUS TO SULFONAMIDES: ICD-10-CM

## 2024-05-24 DIAGNOSIS — E03.9 HYPOTHYROIDISM, UNSPECIFIED: ICD-10-CM

## 2024-05-24 DIAGNOSIS — E78.5 HYPERLIPIDEMIA, UNSPECIFIED: ICD-10-CM

## 2024-05-29 ENCOUNTER — APPOINTMENT (OUTPATIENT)
Dept: CARE COORDINATION | Facility: HOME HEALTH | Age: 79
End: 2024-05-29

## 2024-06-04 ENCOUNTER — TRANSCRIPTION ENCOUNTER (OUTPATIENT)
Age: 79
End: 2024-06-04

## 2024-06-04 RX ORDER — RIVASTIGMINE TARTRATE 3 MG/1
3 CAPSULE ORAL TWICE DAILY
Qty: 60 | Refills: 3 | Status: ACTIVE | COMMUNITY
Start: 2023-12-28 | End: 1900-01-01

## 2024-06-11 ENCOUNTER — TRANSCRIPTION ENCOUNTER (OUTPATIENT)
Age: 79
End: 2024-06-11

## 2024-06-19 ENCOUNTER — TRANSCRIPTION ENCOUNTER (OUTPATIENT)
Age: 79
End: 2024-06-19

## 2024-06-25 ENCOUNTER — APPOINTMENT (OUTPATIENT)
Dept: OBGYN | Facility: CLINIC | Age: 79
End: 2024-06-25
Payer: MEDICARE

## 2024-06-25 VITALS
HEIGHT: 62 IN | WEIGHT: 95 LBS | DIASTOLIC BLOOD PRESSURE: 70 MMHG | SYSTOLIC BLOOD PRESSURE: 130 MMHG | BODY MASS INDEX: 17.48 KG/M2

## 2024-06-25 DIAGNOSIS — N90.4 LEUKOPLAKIA OF VULVA: ICD-10-CM

## 2024-06-25 DIAGNOSIS — N89.8 OTHER SPECIFIED NONINFLAMMATORY DISORDERS OF VAGINA: ICD-10-CM

## 2024-06-25 DIAGNOSIS — N76.2 ACUTE VULVITIS: ICD-10-CM

## 2024-06-25 LAB
BILIRUB UR QL STRIP: NORMAL
CLARITY UR: CLEAR
COLLECTION METHOD: NORMAL
GLUCOSE UR-MCNC: NORMAL
HCG UR QL: 0.2 EU/DL
HGB UR QL STRIP.AUTO: NORMAL
KETONES UR-MCNC: NORMAL
LEUKOCYTE ESTERASE UR QL STRIP: NORMAL
NITRITE UR QL STRIP: NORMAL
PH UR STRIP: 6
PROT UR STRIP-MCNC: NORMAL
SP GR UR STRIP: 1.01

## 2024-06-25 PROCEDURE — 81003 URINALYSIS AUTO W/O SCOPE: CPT | Mod: QW

## 2024-06-25 PROCEDURE — 99213 OFFICE O/P EST LOW 20 MIN: CPT

## 2024-06-25 RX ORDER — NYSTATIN AND TRIAMCINOLONE ACETONIDE 100000; 1 [USP'U]/G; MG/G
100000-0.1 OINTMENT TOPICAL
Qty: 1 | Refills: 0 | Status: ACTIVE | COMMUNITY
Start: 2024-06-25 | End: 1900-01-01

## 2024-06-26 ENCOUNTER — LABORATORY RESULT (OUTPATIENT)
Age: 79
End: 2024-06-26

## 2024-06-27 PROBLEM — N90.4 LICHEN SCLEROSUS OF FEMALE GENITALIA: Status: ACTIVE | Noted: 2020-01-28

## 2024-06-28 LAB — BACTERIA UR CULT: NORMAL

## 2024-08-15 NOTE — ED ADULT NURSE NOTE - NS ED NURSE TRANSPORT WITH
Detail Level: Simple Instructions: This plan will send the code FBSE to the PM system.  DO NOT or CHANGE the price. Price (Do Not Change): 0.00 Cardiac Monitor/Defib/ACLS/Rescue Kit/O2/BVM

## 2024-08-20 ENCOUNTER — APPOINTMENT (OUTPATIENT)
Dept: OBGYN | Facility: CLINIC | Age: 79
End: 2024-08-20

## 2024-08-20 VITALS
WEIGHT: 100 LBS | HEIGHT: 62 IN | SYSTOLIC BLOOD PRESSURE: 106 MMHG | DIASTOLIC BLOOD PRESSURE: 60 MMHG | BODY MASS INDEX: 18.4 KG/M2

## 2024-08-20 DIAGNOSIS — Z01.419 ENCOUNTER FOR GYNECOLOGICAL EXAMINATION (GENERAL) (ROUTINE) W/OUT ABNORMAL FINDINGS: ICD-10-CM

## 2024-08-20 PROCEDURE — 82270 OCCULT BLOOD FECES: CPT

## 2024-08-20 PROCEDURE — G0101: CPT

## 2024-08-20 PROCEDURE — 99397 PER PM REEVAL EST PAT 65+ YR: CPT | Mod: GY

## 2024-08-20 NOTE — PHYSICAL EXAM
[[id number]] : A chaperone was present during the pelvic exam. [Appropriately responsive] : appropriately responsive [Alert] : alert [No Acute Distress] : no acute distress [Soft] : soft [Non-tender] : non-tender [Non-distended] : non-distended [No HSM] : No HSM [No Lesions] : no lesions [No Mass] : no mass [Oriented x3] : oriented x3 [Examination Of The Breasts] : a normal appearance [No Masses] : no breast masses were palpable [Vulvar Atrophy] : vulvar atrophy [Labia Majora] : normal [Labia Minora] : normal [Atrophy] : atrophy [Uterine Adnexae] : normal [Normal rectal exam] : was normal [Normal Brown Stool] : was normal and brown [Normal] : was normal [None] : there was no rectal mass  [Occult Blood Positive] : was negative for occult blood analysis [Internal Hemorrhoid] : no internal hemorrhoids were present [External Hemorrhoid] : no external hemorrhoids were present [Skin Tags] : no residual hemorrhoidal skin tags [FreeTextEntry2] : LICHENS NOTED

## 2024-08-27 LAB — CYTOLOGY CVX/VAG DOC THIN PREP: ABNORMAL

## 2024-09-03 NOTE — ED ADULT NURSE NOTE - NS PRO PASSIVE SMOKE EXP
Caller: Patti Gilbert    Relationship: Self    Best call back number: [phone]    What is the best time to reach you: ANY    Who are you requesting to speak with (clinical staff, provider,  specific staff member): [place]    Do you know the name of the person who called: [place]    What was the call regarding: RETURNING PHONE CALL      
Unknown

## 2024-09-18 ENCOUNTER — APPOINTMENT (OUTPATIENT)
Dept: NEUROLOGY | Facility: CLINIC | Age: 79
End: 2024-09-18

## 2024-09-19 ENCOUNTER — APPOINTMENT (OUTPATIENT)
Dept: NEUROLOGY | Facility: CLINIC | Age: 79
End: 2024-09-19
Payer: MEDICARE

## 2024-09-19 VITALS
WEIGHT: 100 LBS | HEIGHT: 63 IN | TEMPERATURE: 98.2 F | BODY MASS INDEX: 17.72 KG/M2 | SYSTOLIC BLOOD PRESSURE: 124 MMHG | DIASTOLIC BLOOD PRESSURE: 77 MMHG | HEART RATE: 80 BPM

## 2024-09-19 DIAGNOSIS — R41.3 OTHER AMNESIA: ICD-10-CM

## 2024-09-19 DIAGNOSIS — E53.8 DEFICIENCY OF OTHER SPECIFIED B GROUP VITAMINS: ICD-10-CM

## 2024-09-19 PROCEDURE — 99215 OFFICE O/P EST HI 40 MIN: CPT

## 2024-09-19 PROCEDURE — G2211 COMPLEX E/M VISIT ADD ON: CPT

## 2024-09-19 RX ORDER — MEMANTINE HYDROCHLORIDE 5 MG/1
5 TABLET, FILM COATED ORAL
Qty: 70 | Refills: 0 | Status: ACTIVE | COMMUNITY
Start: 2024-09-19 | End: 1900-01-01

## 2024-09-19 NOTE — DATA REVIEWED
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

## 2024-09-19 NOTE — HISTORY OF PRESENT ILLNESS
[FreeTextEntry1] : Ms. Jiang is here today for neurology follow-up.  She is here with her . She was previously seen by Dr. Larios, last in October 2023. She has previously been diagnosed with mild dementia, Alzheimer's type. She was first seen in March 2023 and reported memory difficulties for two years.  At the initial evaluation she scored 24/30 on the MMSE.  She underwent Neurotrax testing in April 2023. The global score was 72.5.  Since the last visit she reports some worsening of her memory. She reports that she also has long covid.  She is independent in her ADLs. She manages her own medications. She denies getting lost in familiar places. She used to take over the finances. Her  took over the finances because he is "quicker".  She denies major balance issues. She denies urinary incontinence. She sleeps for 6-8 hours per night and feels well rested. Her  does not note snoring.  She denies significant mood disturbance.  She was previously on an Exelon patch but due to skin reaction was changed to oral rivastigmine. Previously she was prescribed donepezil 10 mg/day. This was increased to 10 mg BID by Shady Owens but she experienced fatigue. She was also started on memantine in the past, at the same time that donepezil was increased.   She is currently taking rivastigmine 3 mg BID and Donepezil 10 mg hs.

## 2024-09-19 NOTE — DISCUSSION/SUMMARY
[FreeTextEntry1] : Ms. Jiang is a 79 year old woman with chronic cognitive impairment.  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 She also reports a history of long covid.  There is clear evidence of memory impairment.  At her initial neurology visit in March 2023 she scored 24/30 on the MMSE. Today, 9/19/24 she scored 24/30 on the MMSE and 17/30 on the MoCA.  We discussed the importance of staying mentally and physically active. We also discussed the importance of good sleep and healthy (Mediterranean) diet. Strategies such as a weekly pill box and reminders should be used to compensate for memory loss.  If not previously checked, I would check vitamin B12 levels and supplement for any levels lower than 400.  It appears that she is taking both donepezil and rivastigmine.  This office may have been unaware that she is taking donepezil since it is being prescribed through her primary care physician. I explained that these medications have the same mechanism of action and are redundant. Review of the chart states that in the past she did not tolerate memantine.  However, it seems that memantine was started at the same time that the dose of donepezil was increased.  And may have been the increased dose of donepezil that was not tolerated as opposed to the low-dose memantine. I recommend: -Continue donepezil 10 mg at bedtime -Discontinue rivastigmine 3 mg twice a day -Restart memantine with a slow taper of 5 mg/day increasing by 5 mg/day each week to a total of 10 mg twice a day - 5 mg /daily x 1 week then 5 mg BID x 1 week then 10 mg in the AM and 5 mg at night and then 10 mg BID. If she does not tolerate the memantine we will reassess.  Follow-up 6 months, sooner if needed

## 2024-09-19 NOTE — PHYSICAL EXAM
[Total Score ___ / 30] : the patient achieved a score of [unfilled] /30 [Date / Time ___ / 5] : date / time [unfilled] / 5 [Place ___ / 5] : place [unfilled] / 5 [Registration ___ / 3] : registration [unfilled] / 3 [Serial Sevens ___/5] : serial sevens [unfilled] / 5 [Naming 2 Objects ___ / 2] : naming two objects [unfilled] / 2 [Repeating a Sentence ___ / 1] : repeating a sentence [unfilled] / 1 [Writing a Sentence ___ / 1] : write sentence [unfilled] / 1 [3-stage Verbal Command ___ / 3] : three-stage verbal command [unfilled] / 3 [Written Command ___ / 1] : written command [unfilled] / 1 [Copy a Design ___ / 1] : copy a design [unfilled] / 1 [Recall ___ / 3] : recall [unfilled] / 3 [___ / 5] : Visuospatial / Executive: [unfilled] / 5 [0 / 0] : Memory: 0 / 0 [___ / 3] : Attention (Serial 7 subtraction): [unfilled] / 3 [___ / 1] : Fluency: [unfilled] / 1 [___ / 2] : Abstraction: [unfilled] / 2 [___ / 5] : Delayed Recall: [unfilled] / 5 [___ / 6] : Orientation: [unfilled] / 6 [FreeTextEntry1] : Examination: Constitutional: normal, no apparent distress Eyes: normal conjunctiva b/l, no ptosis, visual fields full Respiratory: no respiratory distress, normal effort, normal auscultation Cardiovascular: normal rate, rhythm, no murmurs Neck: supple, no masses Vascular: carotids normal Skin: normal color, no rashes Psych: normal mood, affect  Neurological: Memory: MoCA score 17/30, MMSE 24/30 Language intact/no aphasia Cranial Nerves: II-XII intact, Pupils equally round and reactive to light, ocular muscles/movements intact, no ptosis, no facial weakness, tongue protrudes normally in the midline,  Motor: normal tone, no pronator drift, full strength in all four extremities in the proximal and distal muscle groups Coordination: Fine motor movements intact, rapid alternating movements intact, finger to nose intact bilaterally Sensory: intact to light touch DTRs: symmetric, trace in b/l triceps, trace in b/l biceps, trace in b/l brachioradialis, absent in bilateral patellars, absent in bilateral Achilles Gait: narrow based, steady  [MocaTotal] : 17

## 2024-10-07 ENCOUNTER — NON-APPOINTMENT (OUTPATIENT)
Age: 79
End: 2024-10-07

## 2024-10-07 RX ORDER — MEMANTINE HYDROCHLORIDE 14 MG/1
14 CAPSULE, EXTENDED RELEASE ORAL
Qty: 30 | Refills: 0 | Status: ACTIVE | COMMUNITY
Start: 2024-10-07 | End: 1900-01-01

## 2024-10-25 ENCOUNTER — NON-APPOINTMENT (OUTPATIENT)
Age: 79
End: 2024-10-25

## 2025-03-13 ENCOUNTER — APPOINTMENT (OUTPATIENT)
Dept: NEUROLOGY | Facility: CLINIC | Age: 80
End: 2025-03-13
Payer: MEDICARE

## 2025-03-13 VITALS
HEIGHT: 60 IN | HEART RATE: 89 BPM | DIASTOLIC BLOOD PRESSURE: 82 MMHG | WEIGHT: 100 LBS | BODY MASS INDEX: 19.63 KG/M2 | SYSTOLIC BLOOD PRESSURE: 146 MMHG

## 2025-03-13 DIAGNOSIS — F03.90 UNSPECIFIED DEMENTIA W/OUT BEHAVIORAL DISTURBANCE: ICD-10-CM

## 2025-03-13 DIAGNOSIS — E53.8 DEFICIENCY OF OTHER SPECIFIED B GROUP VITAMINS: ICD-10-CM

## 2025-03-13 PROCEDURE — 99214 OFFICE O/P EST MOD 30 MIN: CPT

## 2025-03-13 PROCEDURE — G2211 COMPLEX E/M VISIT ADD ON: CPT

## 2025-03-13 RX ORDER — MEMANTINE HYDROCHLORIDE 5 MG/1
5 TABLET, FILM COATED ORAL
Qty: 30 | Refills: 3 | Status: ACTIVE | COMMUNITY
Start: 2025-03-13 | End: 1900-01-01

## 2025-04-25 NOTE — PATIENT PROFILE ADULT - FUNCTIONAL ASSESSMENT - BASIC MOBILITY 6.
negative
4-calculated by average/Not able to assess (calculate score using [facility] averaging method)

## 2025-05-06 ENCOUNTER — RX RENEWAL (OUTPATIENT)
Age: 80
End: 2025-05-06

## 2025-06-06 ENCOUNTER — APPOINTMENT (OUTPATIENT)
Dept: NEUROLOGY | Facility: CLINIC | Age: 80
End: 2025-06-06
Payer: MEDICARE

## 2025-06-06 VITALS
SYSTOLIC BLOOD PRESSURE: 130 MMHG | HEART RATE: 79 BPM | HEIGHT: 60 IN | WEIGHT: 95 LBS | DIASTOLIC BLOOD PRESSURE: 82 MMHG | BODY MASS INDEX: 18.65 KG/M2 | TEMPERATURE: 98.2 F

## 2025-06-06 PROCEDURE — G2211 COMPLEX E/M VISIT ADD ON: CPT

## 2025-06-06 PROCEDURE — 99215 OFFICE O/P EST HI 40 MIN: CPT

## 2025-06-11 ENCOUNTER — APPOINTMENT (OUTPATIENT)
Dept: OBGYN | Facility: CLINIC | Age: 80
End: 2025-06-11
Payer: MEDICARE

## 2025-06-11 VITALS
SYSTOLIC BLOOD PRESSURE: 150 MMHG | DIASTOLIC BLOOD PRESSURE: 70 MMHG | WEIGHT: 102 LBS | BODY MASS INDEX: 20.03 KG/M2 | HEIGHT: 60 IN

## 2025-06-11 PROBLEM — B37.31 YEAST VAGINITIS: Status: ACTIVE | Noted: 2025-06-11

## 2025-06-11 PROBLEM — L81.9 HYPOPIGMENTATION: Status: ACTIVE | Noted: 2025-06-11

## 2025-06-11 PROBLEM — R39.9 UTI SYMPTOMS: Status: ACTIVE | Noted: 2025-06-11

## 2025-06-11 LAB
APPEARANCE: CLEAR
BILIRUBIN URINE: NEGATIVE
BLOOD URINE: ABNORMAL
COLOR: YELLOW
GLUCOSE QUALITATIVE U: NEGATIVE
KETONES URINE: NEGATIVE
LEUKOCYTE ESTERASE URINE: ABNORMAL
NITRITE URINE: NEGATIVE
PH URINE: 6.5
PROTEIN URINE: NEGATIVE
SPECIFIC GRAVITY URINE: 1.01
UROBILINOGEN URINE: 0.2 (ref 0.2–?)

## 2025-06-11 PROCEDURE — 99213 OFFICE O/P EST LOW 20 MIN: CPT

## 2025-06-11 PROCEDURE — 81003 URINALYSIS AUTO W/O SCOPE: CPT | Mod: QW

## 2025-06-11 RX ORDER — FLUCONAZOLE 150 MG/1
150 TABLET ORAL
Qty: 2 | Refills: 0 | Status: ACTIVE | COMMUNITY
Start: 2025-06-11 | End: 1900-01-01

## 2025-06-11 RX ORDER — ESTRADIOL 0.1 MG/G
0.1 CREAM VAGINAL
Qty: 1 | Refills: 3 | Status: ACTIVE | COMMUNITY
Start: 2025-06-11 | End: 1900-01-01

## 2025-06-11 RX ORDER — CLOBETASOL PROPIONATE 0.5 MG/G
0.05 OINTMENT TOPICAL
Qty: 1 | Refills: 3 | Status: ACTIVE | COMMUNITY
Start: 2025-06-11 | End: 1900-01-01

## 2025-06-12 LAB
BV BACTERIA RRNA VAG QL NAA+PROBE: NOT DETECTED
C GLABRATA RNA VAG QL NAA+PROBE: NOT DETECTED
CANDIDA RRNA VAG QL PROBE: NOT DETECTED
T VAGINALIS RRNA SPEC QL NAA+PROBE: NOT DETECTED

## 2025-06-15 LAB — BACTERIA UR CULT: ABNORMAL

## 2025-06-25 ENCOUNTER — APPOINTMENT (OUTPATIENT)
Dept: OBGYN | Facility: CLINIC | Age: 80
End: 2025-06-25
Payer: MEDICARE

## 2025-06-25 ENCOUNTER — NON-APPOINTMENT (OUTPATIENT)
Age: 80
End: 2025-06-25

## 2025-06-25 VITALS
SYSTOLIC BLOOD PRESSURE: 138 MMHG | BODY MASS INDEX: 20.03 KG/M2 | RESPIRATION RATE: 18 BRPM | HEIGHT: 60 IN | HEART RATE: 82 BPM | DIASTOLIC BLOOD PRESSURE: 82 MMHG | WEIGHT: 102 LBS

## 2025-06-25 PROBLEM — N90.89 VULVAR LESION: Status: ACTIVE | Noted: 2025-06-25

## 2025-06-25 PROCEDURE — 99213 OFFICE O/P EST LOW 20 MIN: CPT

## 2025-07-23 ENCOUNTER — APPOINTMENT (OUTPATIENT)
Dept: OBGYN | Facility: CLINIC | Age: 80
End: 2025-07-23
Payer: MEDICARE

## 2025-07-23 VITALS
BODY MASS INDEX: 20.03 KG/M2 | SYSTOLIC BLOOD PRESSURE: 132 MMHG | WEIGHT: 102 LBS | DIASTOLIC BLOOD PRESSURE: 80 MMHG | HEIGHT: 60 IN

## 2025-07-23 DIAGNOSIS — N90.4 LEUKOPLAKIA OF VULVA: ICD-10-CM

## 2025-07-23 PROCEDURE — 99212 OFFICE O/P EST SF 10 MIN: CPT
